# Patient Record
Sex: MALE | URBAN - METROPOLITAN AREA
[De-identification: names, ages, dates, MRNs, and addresses within clinical notes are randomized per-mention and may not be internally consistent; named-entity substitution may affect disease eponyms.]

---

## 2024-01-03 ENCOUNTER — PREP FOR PROCEDURE (OUTPATIENT)
Age: 64
End: 2024-01-03

## 2024-01-03 DIAGNOSIS — Z12.11 SCREENING FOR COLON CANCER: Primary | ICD-10-CM

## 2024-02-19 ENCOUNTER — ANESTHESIA (OUTPATIENT)
Dept: GASTROENTEROLOGY | Facility: HOSPITAL | Age: 64
End: 2024-02-19

## 2024-02-19 ENCOUNTER — HOSPITAL ENCOUNTER (OUTPATIENT)
Dept: GASTROENTEROLOGY | Facility: HOSPITAL | Age: 64
Setting detail: OUTPATIENT SURGERY
Discharge: HOME | End: 2024-02-19
Attending: STUDENT IN AN ORGANIZED HEALTH CARE EDUCATION/TRAINING PROGRAM
Payer: COMMERCIAL

## 2024-02-19 ENCOUNTER — ANESTHESIA EVENT (OUTPATIENT)
Dept: GASTROENTEROLOGY | Facility: HOSPITAL | Age: 64
End: 2024-02-19

## 2024-02-19 VITALS
SYSTOLIC BLOOD PRESSURE: 115 MMHG | RESPIRATION RATE: 20 BRPM | BODY MASS INDEX: 21.03 KG/M2 | HEIGHT: 69 IN | OXYGEN SATURATION: 99 % | HEART RATE: 72 BPM | TEMPERATURE: 97 F | DIASTOLIC BLOOD PRESSURE: 74 MMHG | WEIGHT: 141.98 LBS

## 2024-02-19 DIAGNOSIS — Z12.11 SCREENING FOR COLON CANCER: ICD-10-CM

## 2024-02-19 PROBLEM — I10 HTN (HYPERTENSION): Status: ACTIVE | Noted: 2024-02-19

## 2024-02-19 PROBLEM — E78.5 HYPERLIPIDEMIA: Status: ACTIVE | Noted: 2024-02-19

## 2024-02-19 PROCEDURE — G0121 COLON CA SCRN NOT HI RSK IND: HCPCS | Performed by: INTERNAL MEDICINE

## 2024-02-19 RX ORDER — LOSARTAN POTASSIUM AND HYDROCHLOROTHIAZIDE 25; 100 MG/1; MG/1
1 TABLET ORAL DAILY
COMMUNITY
Start: 2023-09-13 | End: 2024-09-12

## 2024-02-19 RX ORDER — PROPOFOL 10 MG/ML
INJECTION, EMULSION INTRAVENOUS AS NEEDED
Status: DISCONTINUED | OUTPATIENT
Start: 2024-02-19 | End: 2024-02-19

## 2024-02-19 RX ORDER — PHENYLEPHRINE HCL IN 0.9% NACL 1 MG/10 ML
SYRINGE (ML) INTRAVENOUS AS NEEDED
Status: DISCONTINUED | OUTPATIENT
Start: 2024-02-19 | End: 2024-02-19

## 2024-02-19 RX ORDER — ATORVASTATIN CALCIUM 10 MG/1
10 TABLET, FILM COATED ORAL DAILY
COMMUNITY
Start: 2023-09-13 | End: 2024-09-12

## 2024-02-19 RX ORDER — SODIUM CHLORIDE, SODIUM LACTATE, POTASSIUM CHLORIDE, CALCIUM CHLORIDE 600; 310; 30; 20 MG/100ML; MG/100ML; MG/100ML; MG/100ML
INJECTION, SOLUTION INTRAVENOUS CONTINUOUS PRN
Status: DISCONTINUED | OUTPATIENT
Start: 2024-02-19 | End: 2024-02-19

## 2024-02-19 RX ORDER — LIDOCAINE HYDROCHLORIDE 20 MG/ML
INJECTION, SOLUTION EPIDURAL; INFILTRATION; INTRACAUDAL; PERINEURAL AS NEEDED
Status: DISCONTINUED | OUTPATIENT
Start: 2024-02-19 | End: 2024-02-19

## 2024-02-19 RX ORDER — MELOXICAM 15 MG/1
TABLET ORAL
COMMUNITY
Start: 2023-12-04

## 2024-02-19 RX ADMIN — Medication 100 MCG: at 10:36

## 2024-02-19 RX ADMIN — PROPOFOL 20 MG: 10 INJECTION, EMULSION INTRAVENOUS at 10:42

## 2024-02-19 RX ADMIN — PROPOFOL 20 MG: 10 INJECTION, EMULSION INTRAVENOUS at 10:39

## 2024-02-19 RX ADMIN — PROPOFOL 120 MG: 10 INJECTION, EMULSION INTRAVENOUS at 10:29

## 2024-02-19 RX ADMIN — PROPOFOL 20 MG: 10 INJECTION, EMULSION INTRAVENOUS at 10:37

## 2024-02-19 RX ADMIN — PROPOFOL 20 MG: 10 INJECTION, EMULSION INTRAVENOUS at 10:33

## 2024-02-19 RX ADMIN — SODIUM CHLORIDE, SODIUM LACTATE, POTASSIUM CHLORIDE, AND CALCIUM CHLORIDE: .6; .31; .03; .02 INJECTION, SOLUTION INTRAVENOUS at 10:29

## 2024-02-19 RX ADMIN — LIDOCAINE HYDROCHLORIDE 100 MG: 20 INJECTION, SOLUTION EPIDURAL; INFILTRATION; INTRACAUDAL; PERINEURAL at 10:29

## 2024-02-19 RX ADMIN — PROPOFOL 20 MG: 10 INJECTION, EMULSION INTRAVENOUS at 10:31

## 2024-02-19 NOTE — ANESTHESIA PREPROCEDURE EVALUATION
Procedure:  COLONOSCOPY    Relevant Problems   ANESTHESIA (within normal limits)      CARDIO   (+) HTN (hypertension)   (+) Hyperlipidemia      GI/HEPATIC  Confirmed NPO appropriate  S/p bowel prep  Reports drinking 1-2 beers daily      /RENAL (within normal limits)      HEMATOLOGY (within normal limits)      MUSCULOSKELETAL (within normal limits)      NEURO/PSYCH (within normal limits)      PULMONARY (within normal limits)   (-) Smoking   (-) URI (upper respiratory infection)        Physical Exam    Airway    Mallampati score: I  TM Distance: >3 FB  Neck ROM: full     Dental       Cardiovascular  Rhythm: regular, Rate: normal    Pulmonary   Breath sounds clear to auscultation    Other Findings        Anesthesia Plan  ASA Score- 2     Anesthesia Type- IV sedation with anesthesia with ASA Monitors.         Additional Monitors:     Airway Plan:     Comment: I discussed the risks and benefits of IV sedation anesthesia including the possibility of the need to convert to general anesthesia and the potential risk of awareness.  The patient was given the opportunity to ask questions, which were answered..       Plan Factors-Exercise tolerance (METS): >4 METS.    Chart reviewed.        Patient is not a current smoker.              Induction- intravenous.    Postoperative Plan-     Informed Consent- Anesthetic plan and risks discussed with patient.  I personally reviewed this patient with the CRNA. Discussed and agreed on the Anesthesia Plan with the CRNA..

## 2024-02-19 NOTE — H&P
"History and Physical -  [facility]  Cheikh Pappas 63 y.o. male MRN: [id number]      HPI: Cheikh Pappas is a 63 y.o. year old male who presents for screening colonoscopy      REVIEW OF SYSTEMS: Per the HPI, and otherwise unremarkable.    Historical Information   Past Medical History:   Diagnosis Date    Hyperlipidemia     Hypertension      Past Surgical History:   Procedure Laterality Date    COLONOSCOPY       Social History   Social History     Substance and Sexual Activity   Alcohol Use Yes    Alcohol/week: 10.0 standard drinks of alcohol    Types: 10 Cans of beer per week     Social History     Substance and Sexual Activity   Drug Use Not Currently    Types: Marijuana     Social History     Tobacco Use   Smoking Status Not on file   Smokeless Tobacco Not on file     History reviewed. No pertinent family history.    Meds/Allergies     (Not in a hospital admission)      No Known Allergies    Objective     Blood pressure 157/97, pulse 85, temperature (!) 97.2 °F (36.2 °C), temperature source Temporal, resp. rate 20, height 5' 9\" (1.753 m), weight 64.4 kg (141 lb 15.6 oz), SpO2 100%.      PHYSICAL EXAM    Gen: NAD  CV: RRR  CHEST: Clear  ABD: soft, NT/ND  EXT: no edema      ASSESSMENT/PLAN:  This is a 63 y.o. year old male here for colonoscopy, and he is stable and optimized for his procedure.          "

## 2025-04-03 VITALS — WEIGHT: 147 LBS | HEIGHT: 69 IN | BODY MASS INDEX: 21.77 KG/M2

## 2025-04-03 DIAGNOSIS — S52.121K CLOSED DISPLACED FRACTURE OF HEAD OF RIGHT RADIUS WITH NONUNION, SUBSEQUENT ENCOUNTER: ICD-10-CM

## 2025-04-03 DIAGNOSIS — M77.11 RIGHT TENNIS ELBOW: Primary | ICD-10-CM

## 2025-04-03 DIAGNOSIS — M71.329 SYNOVIAL CYST OF ELBOW: ICD-10-CM

## 2025-04-03 PROCEDURE — 99204 OFFICE O/P NEW MOD 45 MIN: CPT | Performed by: ORTHOPAEDIC SURGERY

## 2025-04-03 PROCEDURE — 20612 ASPIRATE/INJ GANGLION CYST: CPT | Performed by: ORTHOPAEDIC SURGERY

## 2025-04-03 NOTE — PROGRESS NOTES
Assessment & Plan  Right tennis elbow  Physical examination discussed with patient and his wife which demonstrated findings consistent with lateral epicondylitis in the setting of overuse. Recommended tennis elbow strap and therapy. Follow up after his camping trip in June 2025 as needed.     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 It was discussed in depth that his arthritic changes are a sequelae of his radial head fracture over ten years ago. 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 He had no prior issues until February of this year after throwing a football. It was discussed that it would be reasonable to perform osteocapsular arthroplasty with radial head arthroplasty if he feels his mechanical symptoms worsen over time. The patient would like to treat his tennis elbow symptoms first to see if this will relieve his ailments enough to avoid surgery.        Synovial cyst of elbow  3cm by 3cm anterior ganglion cyst 2cm distal to the elbow flexor crease that was mobile and not firm. Aspiration of the cyst was performed in the clinic today which produced 10cc of yellow clear viscous fluid. Patient has immediate relief after the aspiration.               Cheikh Fitzgeraldadriana  [id number]  1960    [facility]TH[facility] OUTPATIENT NOTE  4/3/2025      HISTORY:  64 y.o. male  who presents for initial evaluation of right elbow pain after an overuse injury in February of this year. He reports a remote radial head fracture that was treated non-operatively roughly ten years ago and he had no major issues since this past February. He reports he felt a pop in his elbow and has mechanical symptoms which are new. He has not lost range of motion compared to his baseline. He does report lateral  elbow pain that is his main concern. The pain is worse with wrist motion and elbow motion. He also reports a cyst in the anterior aspect of the elbow which was drained by another rprovider three weeks ago and has returned, which causes him pain with elbow motion. He would like to discuss options of cyst aspiration and further treatments for his elbow pain. He denies numbness or tingling. He denies weakness in his wrist. He does work outside with power tools often and is very active.      Past Medical History:   Diagnosis Date    Hyperlipidemia     Hypertension        Past Surgical History:   Procedure Laterality Date    COLONOSCOPY         Social History     Socioeconomic History    Marital status: /Civil Union     Spouse name: Not on file    Number of children: Not on file    Years of education: Not on file    Highest education level: Not on file   Occupational History    Not on file   Tobacco Use    Smoking status: Not on file    Smokeless tobacco: Not on file   Vaping Use    Vaping status: Never Used   Substance and Sexual Activity    Alcohol use: Yes     Alcohol/week: 10.0 standard drinks of alcohol     Types: 10 Cans of beer per week    Drug use: Not Currently     Types: Marijuana    Sexual activity: Not on file   Other Topics Concern    Not on file   Social History Narrative    Not on file     Social Drivers of Health     Financial Resource Strain: Low Risk  (9/12/2023)    [facility]    Overall Financial Resource Strain (CARDIA)     Difficulty of Paying Living Expenses: Not hard at all   Food Insecurity: No Food Insecurity (9/18/2024)    Received from [facility]    Hunger Vital Sign     Worried About Running Out of Food in the Last Year: Never true     Ran Out of Food in the Last Year: Never true   Transportation Needs: No Transportation Needs (9/18/2024)    Received from [facility]    HERNANDEZ   "Transportation     Lack of Transportation (Medical): No     Lack of Transportation (Non-Medical): No   Physical Activity: Not on file   Stress: No Stress Concern Present (9/12/2023)    Received from [facility]    [employer] - Occupational Stress Questionnaire     Feeling of Stress : Only a little   Social Connections: Feeling Socially Integrated (9/18/2024)    Received from [facility]    OASIS : Social Isolation     How often do you feel lonely or isolated from those around you?: Never   Intimate Partner Violence: Not At Risk (9/18/2024)    Received from [facility]    Humiliation, Afraid, Rape, and Kick questionnaire     Fear of Current or Ex-Partner: No     Emotionally Abused: No     Physically Abused: No     Sexually Abused: No   Housing Stability: Unknown (9/18/2024)    Received from [facility]    Housing Stability Vital Sign     Unable to Pay for Housing in the Last Year: No     Number of Times Moved in the Last Year: Not on file     Homeless in the Last Year: No       History reviewed. No pertinent family history.     Patient's Medications   New Prescriptions    No medications on file   Previous Medications    ATORVASTATIN (LIPITOR) 10 MG TABLET    Take 10 mg by mouth daily    LOSARTAN-HYDROCHLOROTHIAZIDE (HYZAAR) 100-25 MG PER TABLET    Take 1 tablet by mouth daily    MELOXICAM (MOBIC) 15 MG TABLET    TAKE 1 TABLET BY MOUTH EVERY DAY AFTER MEALS   Modified Medications    No medications on file   Discontinued Medications    No medications on file       No Known Allergies     Ht 5' 9\" (1.753 m)   Wt 66.7 kg (147 lb)   BMI 21.71 kg/m²      REVIEW OF SYSTEMS:  Constitutional: Negative.    HEENT: Negative.    Respiratory: Negative.    Skin: Negative.    Neurological: Negative.    Psychiatric/Behavioral: Negative.  Musculoskeletal: Negative except for that mentioned in the HPI.    Gen: No acute distress, " resting comfortably in bed  HEENT: Eyes clear, moist mucus membranes, hearing intact  Respiratory: No audible wheezing or stridor  Cardiovascular: Well Perfused peripherally, 2+ distal pulse  Abdomen: nondistended, no peritoneal signs     PHYSICAL EXAM:    RIGHT ELBOW:    Appearance: anterior elbow cyst measuring 3x3cm and located 2cm distal to the elbow flexion crease. There is mild increased valgus carrying angle compared to the left.     Flexion: 130 degrees  Extension 30 degrees  Pronation: 80 degrees  Supination: 80 degrees    TTP Lateral Epicondyle: Positive   TTP Medial Epicondyle: negative  TTP Olecranon: negative  TTP Radial Head: negative  TTP Biceps Tendon: negative    Strength:  Flexion: 5/5  Extension: 5/5  Pronation: 5/5  Supination: 5/5    Pain with resisted wrist extension: positive   Pain with resisted 3rd finger extension: Mild   Pain with resisted wrist flexion: negative    Varus laxity: negative  Valgus laxity: negative  Milking maneuver: negative  Moving valgus stress test: negative    Cubital tunnel Tinel's: negative    Radial/median/ulnar nerve intact    <2 sec cap refill        IMAGING:  AP, Lateral and oblique views XR of the right elbow performed February 17th 2025 were reviewed by me which demonstrate malunion of the radial head and neck with severe degenerative changes of the radiocapitellar and ulnohumeral joint.There are multiple loose bodies noted.      ASSESSMENT AND PLAN:  64 y.o. male  with remote history of right radial head fracture now with non-union sequela and post traumatic arthritis and loose body formation in the elbow. Patient also has a 3x3cm ganglion cyst in the anterior elbow which was aspirated in the office today. Patient also has lateral epicondylitis for which he was given PT and Tennis elbow strap for treatment. He will follow up in 6 weeks or after his camping trip in June.     Hand/upper extremity injection  Universal Protocol:  Patient identity confirmed:  verbally with patient  Supporting Documentation  Indications: pain (Ganglion cyst)   Procedure Details  Condition comment: Anterior elbow ganglion   Needle size: 18 G  Ultrasound guidance: no  Aspirate amount: 12 mL  Aspirate: clear and yellow  Dressing:  Sterile dressing applied

## 2025-04-03 NOTE — ASSESSMENT & PLAN NOTE
Physical examination discussed with patient and his wife which demonstrated findings consistent with lateral epicondylitis in the setting of overuse. Recommended tennis elbow strap and therapy. Follow up after his camping trip in June 2025 as needed.     Orders:    Ambulatory Referral to Physical Therapy; Future

## 2025-05-01 ENCOUNTER — EVALUATION (OUTPATIENT)
Dept: OCCUPATIONAL THERAPY | Facility: CLINIC | Age: 65
End: 2025-05-01
Attending: STUDENT IN AN ORGANIZED HEALTH CARE EDUCATION/TRAINING PROGRAM
Payer: COMMERCIAL

## 2025-05-01 DIAGNOSIS — M77.11 RIGHT TENNIS ELBOW: ICD-10-CM

## 2025-05-01 PROCEDURE — 97165 OT EVAL LOW COMPLEX 30 MIN: CPT

## 2025-05-01 PROCEDURE — 97112 NEUROMUSCULAR REEDUCATION: CPT

## 2025-05-01 PROCEDURE — 97110 THERAPEUTIC EXERCISES: CPT

## 2025-05-01 NOTE — PROGRESS NOTES
OT Evaluation     Today's date: 2025  Patient name: Cheikh Pappas  : 1960  MRN: [id number]  Referring provider: Karly Mcelroy DO  Dx:   Encounter Diagnosis     ICD-10-CM    1. Right tennis elbow  M77.11 Ambulatory Referral to Physical Therapy          Start Time: 828  Stop Time: 928  Total time in clinic (min): 60 minutes    Assessment  Impairments: abnormal coordination, abnormal or restricted ROM, abnormal movement, activity intolerance, impaired physical strength, lacks appropriate home exercise program, pain with function, weight-bearing intolerance, unable to perform ADL, activity limitations and endurance  Functional limitations: difficulty with lifting, pushing, pulling due to pain, loss of ROM, weakness  Symptom irritability: moderate    Assessment details: Cheikh Pappas is a 64 y.o., Right HD male referred to hand therapy for R tennis elbow.  Onset of injury >10 years ago due to R radial head fracture with nonunion and post-traumatic severe osteoarthritis of UHJ and RHJ. He re-aggravated his pain in February when throwing a football. He has made improvement since this injury but continues to have pain and stiffness. Patient presents 26 with impaired ROM, strength, and coordination of the RUE.  Deficits also noted in pain and functional use of the right UE. No laxity noted at elbow but patient does have tenderness directly over the fracture site of his radial head and neck and tenderness into his wrist extensors. He also has tightness and pain a the proximal aspect of his wrist flexors in the area of his cyst. Patient is on a trip for 6 weeks so he was provided with an HEP focused on wrist extensor and flexor stretches, biceps stretches, elbow A/AAROM and RTC/shoulder strengthening exercises.  Understanding of Dx/Px/POC: excellent     Prognosis: good    Goals  STGs (4 weeks)  Patient will be independent in implementing HEP prescribed by therapist  Patient will report an average  pain level of 1-2/10  Patient will demonstrate 10# improvement in  strength for improved use of R hand in twisting open jars and lids  Patient will demonstrate active elbow extension to -20 degrees for improved use of R elbow in pickleball  Patient will adhere to activity modification techniques to prevent further aggravation of symptoms    Plan  Patient would benefit from: skilled occupational therapy and home program  Planned modality interventions: thermotherapy: hydrocollator packs    Planned therapy interventions: IASTM, kinesiology taping, joint mobilization, manual therapy, massage, neuromuscular re-education, strengthening, stretching, therapeutic activities, therapeutic exercise, home exercise program, graded exercise, graded activity, functional ROM exercises, flexibility, coordination, compression and activity modification    Plan of Care beginning date: 5/1/2025  Treatment plan discussed with: patient  Plan details: Patient leaving for 6 week trip. He will perform HEP only and was educated that he may resume therapy when returning with a new script from his doctor.        Subjective Evaluation    History of Present Illness  Mechanism of injury: Cheikh Pappas is a 64 y.o. male presenting with right elbow pain after an overuse injury in February of this year. He sustained a radial head fracture that was treated non-operatively 10 years ago and he had no major issues since this past February. Patient now has post-traumatic arthritic changes and nonunion.   He reports he felt a pop in his elbow and has mechanical symptoms which are new. There are no new losses in ROM. He does report lateral elbow pain that is his main concern. The pain is worse with wrist motion and elbow motion as well as squeezing and gripping. He also reports a cyst in the anterior aspect of the elbow which was aspirated 2 months ago and again by orthopedics on 4/3/25.  Patient Goals  Patient goals for therapy: increased strength  "and return to sport/leisure activities  Patient goal: \"Be able to play pickleball without pain\"  Pain  Current pain ratin  At best pain ratin  At worst pain ratin  Location: Lateral aspect of elbow, anterior aspect over cyst, and radiating into extensors  Quality: dull ache  Relieving factors: heat  Exacerbated by: gripping, twisting, heavy pushing, pulling, lifting.  Progression: improved    Social Support    Employment status: not working (retired)  Hand dominance: right      Diagnostic Tests  X-ray: abnormal (right elbow performed 2025 were reviewed by me which demonstrate malunion of the radial head and neck with severe degenerative changes of the radiocapitellar and ulnohumeral joint.There are multiple loose bodies noted)  Treatments  Previous treatment: injection treatment  Current treatment: occupational therapy        Objective     Observations     Right Elbow   Positive for deformity.     Additional Observation Details  Volar forearm cyst distal to antecubital fossa    Tenderness     Right Elbow   Tenderness in the radial head and radiocapitellar joint. No tenderness in the lateral epicondyle.     Right Wrist/Hand   No tenderness in the lateral epicondyle.     Active Range of Motion     Right Shoulder   Normal active range of motion    Left Elbow   Flexion: 145 degrees   Extension: -29 degrees     Right Elbow   Flexion: 145 degrees   Extension: -3 degrees   Forearm supination: WFL  Forearm pronation: 64 degrees with pain    Left Wrist   Wrist flexion: 60 degrees   Wrist extension: 61 degrees     Right Wrist   Wrist flexion: 66 degrees   Wrist extension: 60 degrees     Additional Active Range of Motion Details  D2-5 and thumb WNLs    Passive Range of Motion     Right Elbow   Extension: -22 degrees     Strength/Myotome Testing     Right Shoulder     Planes of Motion   Flexion: 4+   Adduction: 5   External rotation at 0°: 5   Internal rotation at 0°: 5     Right Elbow   Flexion: " "5  Extension: 5  Forearm supination: 5  Forearm pronation: 4    Left Wrist/Hand      (2nd hand position)     Trial 1: 99.6    Thumb Strength  Key/Lateral Pinch     Trial 1: 24  Palmar/Three-Point Pinch     Trial 1: 21    Right Wrist/Hand   Wrist extension: 5  Wrist flexion: 5  Radial deviation: 5  Ulnar deviation: 5     (2nd hand position)     Trial 1: 70.9    Thumb Strength   Key/Lateral Pinch     Trial 1: 19  Palmar/Three-Point Pinch     Trial 1: 17    Additional Strength Details  Reports pressure at his lateral elbow when resisting ER and IR    Tests     Right Elbow   Positive Cozen's and Maudsley's.   Negative Mill's, valgus stress at 0 degrees and valgus stress at 30 degrees.     Additional Tests Details  (+) Biceps tightness: -41 active EE with shoulder extended, -23 active EE with shoulder flexed    Swelling   Left Elbow Girth Measurements   Joint line: 28 cm    Right Elbow Girth Measurements   Joint line: 28 cm             Precautions: R radial head fx nonunion  Access Code: [email or web address]  POC expires Unit limit Auth  expiration date PT/OT + Visit Limit?   7/10/25   30 PT/OT                 Visit/Unit Tracking  AUTH Status:  Date 5/1 IE              Auth req Used 1               Remaining                     Manuals 5/1 IE                                            Neuro Re-Ed         IR with TB 3x10        ER with TB 3x10        Shldr ext with TB 3x10                                            Ther Ex         Wrist extensor stretch 10 x 10\"        Wrist flexor stretch 10 x 10\"        Elbow A/AAROM X10 AA        Biceps stretch 3 x 30\"                                            Ther Activity         Activity mod Avoid lifting elbow ext, FA pronated                  HEP                           Modalities         MHP 5'                         "

## 2025-06-18 ENCOUNTER — HOSPITAL ENCOUNTER (OUTPATIENT)
Dept: RADIOLOGY | Facility: HOSPITAL | Age: 65
Discharge: HOME | End: 2025-06-18
Attending: ORTHOPAEDIC SURGERY
Payer: COMMERCIAL

## 2025-06-18 VITALS — BODY MASS INDEX: 21.44 KG/M2 | WEIGHT: 145.2 LBS

## 2025-06-18 DIAGNOSIS — M25.521 PAIN IN RIGHT ELBOW: ICD-10-CM

## 2025-06-18 DIAGNOSIS — M71.329 SYNOVIAL CYST OF ELBOW: ICD-10-CM

## 2025-06-18 DIAGNOSIS — S52.121K CLOSED DISPLACED FRACTURE OF HEAD OF RIGHT RADIUS WITH NONUNION, SUBSEQUENT ENCOUNTER: Primary | ICD-10-CM

## 2025-06-18 PROCEDURE — 73080 X-RAY EXAM OF ELBOW: CPT

## 2025-06-18 PROCEDURE — 99215 OFFICE O/P EST HI 40 MIN: CPT | Performed by: ORTHOPAEDIC SURGERY

## 2025-06-18 RX ORDER — CHLORHEXIDINE GLUCONATE 40 MG/ML
SOLUTION TOPICAL DAILY PRN
OUTPATIENT
Start: 2025-06-18

## 2025-06-18 RX ORDER — SODIUM CHLORIDE, SODIUM LACTATE, POTASSIUM CHLORIDE, CALCIUM CHLORIDE 600; 310; 30; 20 MG/100ML; MG/100ML; MG/100ML; MG/100ML
20 INJECTION, SOLUTION INTRAVENOUS CONTINUOUS
OUTPATIENT
Start: 2025-06-18

## 2025-06-18 RX ORDER — CHLORHEXIDINE GLUCONATE ORAL RINSE 1.2 MG/ML
15 SOLUTION DENTAL ONCE
OUTPATIENT
Start: 2025-06-18 | End: 2025-06-18

## 2025-06-18 NOTE — PROGRESS NOTES
ORTHO CARE [facility] [address] [address] [phone]       Cheikh Pappas  [id number]  1960    ORTHOPAEDIC SURGERY OUTPATIENT NOTE  6/18/2025    :  Assessment & Plan         - Patient presents with further displacement of his nonunion radial head and neck fracture.  -At this time I discussed surgery to remove the debris and possible radial head arthroplasty as well as open osteocapsular arthroplasty.  - I did discuss to remove the ganglion cyst as well.  - At this time he elected for surgery, benefits and risks were explained in detail, he elected for nonunion radial head arthroplasty and ganglion cyst removal as well as osteophyte excision.  -I did discuss with him that we will need further imaging such as MRI and CT scans to further evaluate his condition.  The patient understands the risks and benefits of the procedure with risks including pain, stiffness, infection, neurovascular injury, recurrence of symptoms, failure of surgical procedure, inadvertent intraoperative complications, blood loss, blood clots, allergic reaction to anesthesia, stroke, heart attack, all up to and including to death. The patient understood and did consent for surgery today.         HISTORY:  64 y.o. 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  Patient states he was not using the tennis elbow strap as this was irritating his cyst along his elbow.  He does note that approximately 3 weeks ago he was lifting a carseat and felt a painful pop in his elbow and noticed increase in swelling and painful bony bump along the lateral aspect of his elbow.  He denies numbness and tingling traveling down his arm, has not been using OTC medicine for pain control.  He did go to formal physical therapy and was given home  stretches and exercises, he was compliant with the stretches however the exercises increase his pain.  He states he is ready to talk about surgery to alleviate his pain and increase his range of motion.        Past Medical History[1]    Past Surgical History[2]    Social History     Socioeconomic History    Marital status: /Civil Union     Spouse name: Not on file    Number of children: Not on file    Years of education: Not on file    Highest education level: Not on file   Occupational History    Not on file   Tobacco Use    Smoking status: Not on file    Smokeless tobacco: Not on file   Vaping Use    Vaping status: Never Used   Substance and Sexual Activity    Alcohol use: Yes     Alcohol/week: 10.0 standard drinks of alcohol     Types: 10 Cans of beer per week    Drug use: Not Currently     Types: Marijuana    Sexual activity: Not on file   Other Topics Concern    Not on file   Social History Narrative    Not on file     Social Drivers of Health     Financial Resource Strain: Low Risk  (9/12/2023)    Received from [facility]    Overall Financial Resource Strain (CARDIA)     Difficulty of Paying Living Expenses: Not hard at all   Food Insecurity: No Food Insecurity (9/18/2024)    Received from [facility]    Hunger Vital Sign     Within the past 12 months, you worried that your food would run out before you got the money to buy more.: Never true     Within the past 12 months, the food you bought just didn't last and you didn't have money to get more.: Never true   Transportation Needs: No Transportation Needs (9/18/2024)    Received from [facility]    PRAPARE - Transportation     Lack of Transportation (Medical): No     Lack of Transportation (Non-Medical): No   Physical Activity: Not on file   Stress: No Stress Concern Present (9/12/2023)    Received from [facility]    [place] [employer] - Occupational Stress  Questionnaire     Feeling of Stress : Only a little   Social Connections: Feeling Socially Integrated (9/18/2024)    Received from [facility]    OASIS : Social Isolation     How often do you feel lonely or isolated from those around you?: Never   Intimate Partner Violence: Not At Risk (9/18/2024)    Received from [facility]    Humiliation, Afraid, Rape, and Kick questionnaire     Within the last year, have you been afraid of your partner or ex-partner?: No     Within the last year, have you been humiliated or emotionally abused in other ways by your partner or ex-partner?: No     Within the last year, have you been kicked, hit, slapped, or otherwise physically hurt by your partner or ex-partner?: No     Within the last year, have you been raped or forced to have any kind of sexual activity by your partner or ex-partner?: No   Housing Stability: Unknown (9/18/2024)    Received from [facility]    Housing Stability Vital Sign     In the last 12 months, was there a time when you were not able to pay the mortgage or rent on time?: No     Number of Times Moved in the Last Year: Not on file     At any time in the past 12 months, were you homeless or living in a shelter (including now)?: No       Family History[3]     Patient's Medications   New Prescriptions    No medications on file   Previous Medications    ATORVASTATIN (LIPITOR) 10 MG TABLET    Take 10 mg by mouth daily    LOSARTAN-HYDROCHLOROTHIAZIDE (HYZAAR) 100-25 MG PER TABLET    Take 1 tablet by mouth daily    MELOXICAM (MOBIC) 15 MG TABLET    TAKE 1 TABLET BY MOUTH EVERY DAY AFTER MEALS   Modified Medications    No medications on file   Discontinued Medications    No medications on file       Allergies[4]     Wt 65.9 kg (145 lb 3.2 oz)   BMI 21.44 kg/m²      REVIEW OF SYSTEMS:  Constitutional: Negative.    HEENT: Negative.    Respiratory: Negative.    Skin: Negative.    Neurological: Negative.     Psychiatric/Behavioral: Negative.  Musculoskeletal: Negative except for that mentioned in the HPI.    Gen: No acute distress, resting comfortably in bed  HEENT: Eyes clear, moist mucus membranes, hearing intact  Respiratory: No audible wheezing or stridor  Cardiovascular: Well Perfused peripherally, 2+ distal pulse  Abdomen: nondistended, no peritoneal signs     PHYSICAL EXAM:  RIGHT ELBOW:    Appearance: anterior elbow cyst measuring 3x2cm and located 2cm distal to the elbow flexion crease. There is mild increased valgus carrying angle compared to the left.  Localized bony bump along the radial head.      Flexion: 130 degrees  Extension: 24 degrees  Pronation: 80 degrees  Supination: 80 degrees    TTP Lateral Epicondyle: negative  TTP Medial Epicondyle: negative  TTP Olecranon: negative  TTP Radial Head: positive  TTP Biceps Tendon: negative    Strength:  Flexion: 5/5  Extension: 5/5  Pronation: 5/5  Supination: 5/5    Pain with resisted wrist extension: positive   Pain with resisted 3rd finger extension: negative  Pain with resisted wrist flexion: negative    Varus laxity: negative  Valgus laxity: negative  Milking maneuver: negative  Moving valgus stress test: negative    Cubital tunnel Tinel's: negative    Radial/median/ulnar nerve intact    <2 sec cap refill       IMAGING:  Xrays of the right elbow obtained on 6/18/2025 demonstrate AP, Lateral and oblique views were reviewed by me which demonstrate malunion of the radial head and neck with severe degenerative changes of the radiocapitellar and ulnohumeral joint, with further displacement of the radial head. There are multiple loose bodies noted           [1]   Past Medical History:  Diagnosis Date    Hyperlipidemia     Hypertension    [2]   Past Surgical History:  Procedure Laterality Date    COLONOSCOPY     [3] No family history on file.  [4] No Known Allergies

## 2025-06-18 NOTE — H&P (VIEW-ONLY)
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  -At this time I discussed surgery to remove the debris and possible radial head arthroplasty as well as open osteocapsular arthroplasty.  - I did discuss to remove the ganglion cyst as well.  - At this time he elected for surgery, benefits and risks were explained in detail, he elected for nonunion radial head arthroplasty and ganglion cyst removal as well as osteophyte excision.  -I did discuss with him that we will need further imaging such as MRI and CT scans to further evaluate his condition.  The patient understands the risks and benefits of the procedure with risks including pain, stiffness, infection, neurovascular injury, recurrence of symptoms, failure of surgical procedure, inadvertent intraoperative complications, blood loss, blood clots, allergic reaction to anesthesia, stroke, heart attack, all up to and including to death. The patient understood and did consent for surgery today.         HISTORY:  64 y.o. 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  Patient states he was not using the tennis elbow strap as this was irritating his cyst along his elbow.  He does note that approximately 3 weeks ago he was lifting a carseat and felt a painful pop in his elbow and noticed increase in swelling and painful bony bump along the lateral aspect of his elbow.  He denies numbness and tingling traveling down his arm, has not been using OTC medicine for pain control.  He did go to formal physical therapy and was given home  stretches and exercises, he was compliant with the stretches however the exercises increase his pain.  He states he is ready to talk about surgery to alleviate his pain and increase his range of motion.        Past Medical History[1]    Past Surgical History[2]    Social History     Socioeconomic History    Marital status: /Civil Union     Spouse name: Not on file    Number of children: Not on file    Years of education: Not on file    Highest education level: Not on file   Occupational History    Not on file   Tobacco Use    Smoking status: Not on file    Smokeless tobacco: Not on file   Vaping Use    Vaping status: Never Used   Substance and Sexual Activity    Alcohol use: Yes     Alcohol/week: 10.0 standard drinks of alcohol     Types: 10 Cans of beer per week    Drug use: Not Currently     Types: Marijuana    Sexual activity: Not on file   Other Topics Concern    Not on file   Social History Narrative    Not on file     Social Drivers of Health     Financial Resource Strain: Low Risk  (9/12/2023)    Received from [facility]    Overall Financial Resource Strain (CARDIA)     Difficulty of Paying Living Expenses: Not hard at all   Food Insecurity: No Food Insecurity (9/18/2024)    Received from [facility]    Hunger Vital Sign     Within the past 12 months, you worried that your food would run out before you got the money to buy more.: Never true     Within the past 12 months, the food you bought just didn't last and you didn't have money to get more.: Never true   Transportation Needs: No Transportation Needs (9/18/2024)    Received from [facility]    PRAPARE - Transportation     Lack of Transportation (Medical): No     Lack of Transportation (Non-Medical): No   Physical Activity: Not on file   Stress: No Stress Concern Present (9/12/2023)    Received from [facility]    [place] [employer] - Occupational Stress  Questionnaire     Feeling of Stress : Only a little   Social Connections: Feeling Socially Integrated (9/18/2024)    Received from [facility]    OASIS : Social Isolation     How often do you feel lonely or isolated from those around you?: Never   Intimate Partner Violence: Not At Risk (9/18/2024)    Received from [facility]    Humiliation, Afraid, Rape, and Kick questionnaire     Within the last year, have you been afraid of your partner or ex-partner?: No     Within the last year, have you been humiliated or emotionally abused in other ways by your partner or ex-partner?: No     Within the last year, have you been kicked, hit, slapped, or otherwise physically hurt by your partner or ex-partner?: No     Within the last year, have you been raped or forced to have any kind of sexual activity by your partner or ex-partner?: No   Housing Stability: Unknown (9/18/2024)    Received from [facility]    Housing Stability Vital Sign     In the last 12 months, was there a time when you were not able to pay the mortgage or rent on time?: No     Number of Times Moved in the Last Year: Not on file     At any time in the past 12 months, were you homeless or living in a shelter (including now)?: No       Family History[3]     Patient's Medications   New Prescriptions    No medications on file   Previous Medications    ATORVASTATIN (LIPITOR) 10 MG TABLET    Take 10 mg by mouth daily    LOSARTAN-HYDROCHLOROTHIAZIDE (HYZAAR) 100-25 MG PER TABLET    Take 1 tablet by mouth daily    MELOXICAM (MOBIC) 15 MG TABLET    TAKE 1 TABLET BY MOUTH EVERY DAY AFTER MEALS   Modified Medications    No medications on file   Discontinued Medications    No medications on file       Allergies[4]     Wt 65.9 kg (145 lb 3.2 oz)   BMI 21.44 kg/m²      REVIEW OF SYSTEMS:  Constitutional: Negative.    HEENT: Negative.    Respiratory: Negative.    Skin: Negative.    Neurological: Negative.     Psychiatric/Behavioral: Negative.  Musculoskeletal: Negative except for that mentioned in the HPI.    Gen: No acute distress, resting comfortably in bed  HEENT: Eyes clear, moist mucus membranes, hearing intact  Respiratory: No audible wheezing or stridor  Cardiovascular: Well Perfused peripherally, 2+ distal pulse  Abdomen: nondistended, no peritoneal signs     PHYSICAL EXAM:  RIGHT ELBOW:    Appearance: anterior elbow cyst measuring 3x2cm and located 2cm distal to the elbow flexion crease. There is mild increased valgus carrying angle compared to the left.  Localized bony bump along the radial head.      Flexion: 130 degrees  Extension: 24 degrees  Pronation: 80 degrees  Supination: 80 degrees    TTP Lateral Epicondyle: negative  TTP Medial Epicondyle: negative  TTP Olecranon: negative  TTP Radial Head: positive  TTP Biceps Tendon: negative    Strength:  Flexion: 5/5  Extension: 5/5  Pronation: 5/5  Supination: 5/5    Pain with resisted wrist extension: positive   Pain with resisted 3rd finger extension: negative  Pain with resisted wrist flexion: negative    Varus laxity: negative  Valgus laxity: negative  Milking maneuver: negative  Moving valgus stress test: negative    Cubital tunnel Tinel's: negative    Radial/median/ulnar nerve intact    <2 sec cap refill       IMAGING:  Xrays of the right elbow obtained on 6/18/2025 demonstrate AP, Lateral and oblique views were reviewed by me which demonstrate malunion of the radial head and neck with severe degenerative changes of the radiocapitellar and ulnohumeral joint, with further displacement of the radial head. There are multiple loose bodies noted           [1]   Past Medical History:  Diagnosis Date    Hyperlipidemia     Hypertension    [2]   Past Surgical History:  Procedure Laterality Date    COLONOSCOPY     [3] No family history on file.  [4] No Known Allergies

## 2025-06-24 DIAGNOSIS — Z01.818 PREOPERATIVE CLEARANCE: Primary | ICD-10-CM

## 2025-06-24 PROBLEM — S52.91XA CLOSED RIGHT RADIAL FRACTURE: Status: ACTIVE | Noted: 2025-06-24

## 2025-06-24 PROBLEM — N52.8 OTHER MALE ERECTILE DYSFUNCTION: Status: ACTIVE | Noted: 2025-06-24

## 2025-06-24 PROBLEM — M67.40 GANGLION CYST: Status: ACTIVE | Noted: 2022-04-12

## 2025-06-24 RX ORDER — SILDENAFIL 100 MG/1
TABLET, FILM COATED ORAL
COMMUNITY
Start: 2025-01-27

## 2025-06-24 NOTE — PROGRESS NOTES
Internal Medicine Pre-Operative Evaluation:     Reason for Visit: Pre-operative Evaluation for Risk Stratification and Optimization    Patient ID: Cheikh Pappas is a 64 y.o. male.     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     After evaluation and discussion with patient with emphasis that all surgery has some degree of inherent risk it is acknowledged by patient this risk is Acceptable.    Patient is optimized and may proceed with planned procedure.     Assessment    Pre-operative Medical Evaluation for planned surgery  Recommendations as listed in PLAN section below  Contact surgical nurse  navigator with any questions regarding preoperative plan or schedule.      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 Further preoperative workup as follows:   - none no further testing required may proceed with surgery    2. Preoperative Medication  Management Review performed by [facility] nursing  NO    3. Patient requires further consultation with:   No Consults Required    4. Discharge Planning / Barriers to Discharge  none identified - patients has post discharge therapy plan in place, transportation arranged for discharge day, adequate family support at home to assist with discharge to home.        Subjective:           History of Present Illness:     Cheikh Pappas is a 64 y.o. male who presents to the office today for a preoperative consultation at the request of surgeon. The patient understands this is an elective procedure and not emergent. They are electing to undergo planned procedure with an understanding that all surgery has inherent risk. They have worked with their surgeon and failed conservative treatment measures. Today they present for preoperative risk assessment and recommendations for optimization in preparation for surgery.    Pt seen in center for perioperative medicine for upcoming proposed surgery. They have failed previous conservative measures and have elected surgical intervention.     Pt meets presurgical lab and optimization goals.      Cheikh Pappas has an IN HOSPITAL cardiac risk of RCI RISK CLASS I (0 risk factors, risk of major cardiac compl. appr. 0.5%) based on RCRI calculator    Cardiac Risk Estimation: per the Revised Cardiac Risk Index (Circ. 100:1043, 1999),         Pre-op Exam    Previous history of bleeding disorders or clots?: No  Previous Anesthesia reaction?: No  Prolonged steroid use in the last 6 months?: No    Assessment of Cardiac Risk:   - Unstable or severe angina or MI in the last 6 weeks or history of stent placement in the last year?: No   - Decompensated heart failure (e.g. New onset heart failure, NYHA  Class IV heart failure, or worsening existing heart failure)?: No  - Significant arrhythmias such as high grade AV block, symptomatic ventricular arrhythmia, newly recognized ventricular tachycardia,  "supraventricular tachycardia with resting heart rate >100, or symptomatic bradycardia?: No  - Severe heart valve disease including aortic stenosis or symptomatic mitral stenosis?: No      Pre-operative Risk Factors:  Elevated-risk surgery: No    History of cerebrovascular disease: No    History of ischemic heart disease: No  Pre-operative treatment with insulin: No  Pre-operative creatinine >2 mg/dL: No    History of congestive heart failure: No    Duke Activity Status Index (DASI):   DASI Total Score: 42.2  METs: 7.9        ROS: No TIA's or unusual headaches, no dysphagia.  No prolonged cough. No dyspnea or chest pain on exertion.  No abdominal pain, change in bowel habits, black or bloody stools.  No urinary tract or BPH symptoms.  Positive reported pain in arthritic joint. Positive difficulty with gait. No skin rashes or issues.      Objective:    /82   Pulse 84   Ht 5' 9\" (1.753 m)   Wt 64.4 kg (142 lb)   BMI 20.97 kg/m²       General Appearance: no distress, conversive  HEENT: PERRLA, conjuctiva normal; oropharynx clear; mucous membranes moist;   Neck:  Supple, no lymphadenopathy or thyromegaly  Lungs: breath sounds normal, normal respiratory effort, no retractions, expiratory effort normal  CV: normal heart sounds S1/S2, PMI normal   ABD: soft non tender, +BSx4  EXT: DP pulses intact, no lymphadenopathy, no edema  Skin: normal turgor, normal texture, no rash  Psych: affect normal, mood normal  Neuro: AAOx3        The following portions of the patient's history were reviewed and updated as appropriate: allergies, current medications, past family history, past medical history, past social history, past surgical history and problem list.     Past History:       Past Medical History[1] Past Surgical History[2]       Social History[3]  Family History[4]       Allergies:     Allergies[5]     Current Medications:     Current Outpatient Medications   Medication Instructions    atorvastatin (LIPITOR) 10 mg, " "Daily    losartan-hydrochlorothiazide (HYZAAR) 100-25 MG per tablet 1 tablet, Daily    sildenafil (VIAGRA) 100 mg tablet TAKE 1 TABLET DAILY AS NEEDED FOR ERECTILE DYSFUNCTION           PRE-OP WORKSHEET DATA    Assessment of Pre-Operative Risks     MLJ Quality Hard Stops:    BMI (<40) : Estimated body mass index is 20.97 kg/m² as calculated from the following:    Height as of this encounter: 5' 9\" (1.753 m).    Weight as of this encounter: 64.4 kg (142 lb).    Hgb ( >11):   Lab Results   Component Value Date    HGB 14.4 07/01/2025       HbA1c (<7.5) :   Lab Results   Component Value Date    HGBA1C 5.5 07/01/2025       GFR (>60) (Less then 45 = Nephrology consult):    Lab Results   Component Value Date    EGFR 67 07/01/2025    EGFR 86 09/11/2024    EGFR 81 08/21/2023            Pre-Op Data Reviewed:       Laboratory Results: I have personally reviewed the pertinent reports    EKG: I personally reviewed and interpreted available tracings in the electronic medical record    Encounter Date: 07/01/25   ECG 12 lead   Result Value    Ventricular Rate 88    Atrial Rate 88    PA Interval 184    QRSD Interval 88    QT Interval 350    QTC Interval 424    P Axis 43    QRS Axis 34    T Wave Axis 55    Narrative    Normal sinus rhythm  Normal ECG  No previous ECGs available  Confirmed by Alberto Alejandre ([id number]) on 7/2/2025 5:19:11 AM       OLD RECORDS: reviewed old records in the chart review section if EHR on day of visit.    Previous cardiopulmonary studies within the past year:  Echocardiogram:none    Previous STRESS TEST:  No results found for this or any previous visit.    Previous Cath/PCI:  No results found for this or any previous visit.    ECHO:  No results found for this or any previous visit.        Time of visit including pre-visit chart review, visit and post-visit coordination of plan and care , review of pre-surgical lab work, preparation and time spent documenting note in electronic medical record, time spent " face-to-face in physical examination answering patient questions by care team was a minimum of  35 minutes             [facility]         [1]   Past Medical History:  Diagnosis Date    Hyperlipidemia     Hypertension    [2]   Past Surgical History:  Procedure Laterality Date    COLONOSCOPY      KNEE ARTHROSCOPY Left    [3]   Social History  Tobacco Use    Smoking status: Never    Smokeless tobacco: Never   Vaping Use    Vaping status: Never Used   Substance Use Topics    Alcohol use: Yes     Alcohol/week: 10.0 standard drinks of alcohol     Types: 10 Cans of beer per week    Drug use: Not Currently     Types: Marijuana   [4] No family history on file.  [5] No Known Allergies

## 2025-06-24 NOTE — PATIENT INSTRUCTIONS
BEFORE SURGERY    Contact your surgical nurse navigator or surgical provider with any questions regarding preoperative plan or schedule.  Stop all over the counter supplements, herbal, naturopathic  medications for 1 week prior to surgery UNLESS prescribed by your surgeon  Hold NSAIDS (i.e. advil, alleve, motrin, ibuprofen, celebrex) minimum 5 days prior to surgery  Follow presurgical medication instructions provided by preadmission nursing team reviewed during your presurgery phone call  Strategies for optimizing your surgery through breathing exercises, nutrition and physical activity can be found at [email or web address]  Call [phone] with any presurgical concerns or medications questions or use the messaging feature in your Hung[facility] tarik to contact your provider    AFTER SURGERY    Recommend using Tylenol ( acetaminophen ) 1000 mg every eight hours during the first week post discharge along with icing the area for 20 mins every 3-4 hours while awake can be helpful in reducing your need for post operative opioid use. This opioid sparing plan can be used along side your surgeons pain plan.  Use stool softener over the counter (colace) daily after surgery during the first 1-2 weeks to avoid post operative constipation issues  If no bowel movement within 3 days after surgery then use over the counter Miralax in addition to your stool softener   If cleared by your surgical team for activity then early and often walking is encouraged and can be important in prevention of post surgical blood clots. Additionally spend as much time out of bed as possible and allowed by your surgical team  Use your incentive spirometer twice per hour in the first seven days after surgery to help prevent post surgery lung complications and infections  It is very important you follow the instructions from your surgeon regarding any medications for after surgery blood clot prevention. Compliance with these medications or interventions is very  important.  Call [phone] with any post discharge concerns or medical issues or use the messaging feature in your upad tarik to contact your provider

## 2025-07-01 ENCOUNTER — OFFICE VISIT (OUTPATIENT)
Dept: LAB | Facility: HOSPITAL | Age: 65
End: 2025-07-01
Attending: NURSE PRACTITIONER
Payer: COMMERCIAL

## 2025-07-01 ENCOUNTER — HOSPITAL ENCOUNTER (OUTPATIENT)
Dept: MRI IMAGING | Facility: HOSPITAL | Age: 65
Discharge: HOME | End: 2025-07-01
Attending: ORTHOPAEDIC SURGERY
Payer: COMMERCIAL

## 2025-07-01 ENCOUNTER — HOSPITAL ENCOUNTER (OUTPATIENT)
Dept: CT IMAGING | Facility: HOSPITAL | Age: 65
Discharge: HOME | End: 2025-07-01
Attending: ORTHOPAEDIC SURGERY
Payer: COMMERCIAL

## 2025-07-01 ENCOUNTER — APPOINTMENT (OUTPATIENT)
Dept: LAB | Facility: HOSPITAL | Age: 65
End: 2025-07-01
Payer: COMMERCIAL

## 2025-07-01 DIAGNOSIS — Z01.818 PREOPERATIVE CLEARANCE: ICD-10-CM

## 2025-07-01 DIAGNOSIS — S52.121K CLOSED DISPLACED FRACTURE OF HEAD OF RIGHT RADIUS WITH NONUNION, SUBSEQUENT ENCOUNTER: ICD-10-CM

## 2025-07-01 LAB
ALBUMIN SERPL BCG-MCNC: 4.3 G/DL (ref 3.5–5)
ALP SERPL-CCNC: 59 U/L (ref 34–104)
ALT SERPL W P-5'-P-CCNC: 18 U/L (ref 7–52)
ANION GAP SERPL CALCULATED.3IONS-SCNC: 7 MMOL/L (ref 4–13)
AST SERPL W P-5'-P-CCNC: 24 U/L (ref 13–39)
BASOPHILS # BLD AUTO: 0.02 THOUSANDS/ÂΜL (ref 0–0.1)
BASOPHILS NFR BLD AUTO: 0 % (ref 0–1)
BILIRUB SERPL-MCNC: 0.74 MG/DL (ref 0.2–1)
BUN SERPL-MCNC: 25 MG/DL (ref 5–25)
CALCIUM SERPL-MCNC: 9.8 MG/DL (ref 8.4–10.2)
CHLORIDE SERPL-SCNC: 99 MMOL/L (ref 96–108)
CO2 SERPL-SCNC: 31 MMOL/L (ref 21–32)
CREAT SERPL-MCNC: 1.15 MG/DL (ref 0.6–1.3)
CRP SERPL QL: 9.5 MG/L
EOSINOPHIL # BLD AUTO: 0.11 THOUSAND/ÂΜL (ref 0–0.61)
EOSINOPHIL NFR BLD AUTO: 2 % (ref 0–6)
ERYTHROCYTE [DISTWIDTH] IN BLOOD BY AUTOMATED COUNT: 12.8 % (ref 11.6–15.1)
ERYTHROCYTE [SEDIMENTATION RATE] IN BLOOD: 22 MM/HOUR (ref 0–19)
GFR SERPL CREATININE-BSD FRML MDRD: 67 ML/MIN/1.73SQ M
GLUCOSE SERPL-MCNC: 92 MG/DL (ref 65–140)
HCT VFR BLD AUTO: 43.4 % (ref 36.5–49.3)
HGB BLD-MCNC: 14.4 G/DL (ref 12–17)
IMM GRANULOCYTES # BLD AUTO: 0.01 THOUSAND/UL (ref 0–0.2)
IMM GRANULOCYTES NFR BLD AUTO: 0 % (ref 0–2)
INR PPP: 0.94 (ref 0.85–1.19)
LYMPHOCYTES # BLD AUTO: 1.16 THOUSANDS/ÂΜL (ref 0.6–4.47)
LYMPHOCYTES NFR BLD AUTO: 19 % (ref 14–44)
MCH RBC QN AUTO: 34.2 PG (ref 26.8–34.3)
MCHC RBC AUTO-ENTMCNC: 33.2 G/DL (ref 31.4–37.4)
MCV RBC AUTO: 103 FL (ref 82–98)
MONOCYTES # BLD AUTO: 0.39 THOUSAND/ÂΜL (ref 0.17–1.22)
MONOCYTES NFR BLD AUTO: 6 % (ref 4–12)
NEUTROPHILS # BLD AUTO: 4.51 THOUSANDS/ÂΜL (ref 1.85–7.62)
NEUTS SEG NFR BLD AUTO: 73 % (ref 43–75)
NRBC BLD AUTO-RTO: 0 /100 WBCS
PLATELET # BLD AUTO: 228 THOUSANDS/UL (ref 149–390)
PMV BLD AUTO: 8.6 FL (ref 8.9–12.7)
POTASSIUM SERPL-SCNC: 3.7 MMOL/L (ref 3.5–5.3)
PROT SERPL-MCNC: 7.1 G/DL (ref 6.4–8.4)
PROTHROMBIN TIME: 13.1 SECONDS (ref 12.3–15)
RBC # BLD AUTO: 4.21 MILLION/UL (ref 3.88–5.62)
SODIUM SERPL-SCNC: 137 MMOL/L (ref 135–147)
WBC # BLD AUTO: 6.2 THOUSAND/UL (ref 4.31–10.16)

## 2025-07-01 PROCEDURE — 93005 ELECTROCARDIOGRAM TRACING: CPT

## 2025-07-01 PROCEDURE — 85025 COMPLETE CBC W/AUTO DIFF WBC: CPT

## 2025-07-01 PROCEDURE — 73221 MRI JOINT UPR EXTREM W/O DYE: CPT

## 2025-07-01 PROCEDURE — 83036 HEMOGLOBIN GLYCOSYLATED A1C: CPT

## 2025-07-01 PROCEDURE — 73200 CT UPPER EXTREMITY W/O DYE: CPT

## 2025-07-01 PROCEDURE — 86140 C-REACTIVE PROTEIN: CPT

## 2025-07-01 PROCEDURE — 80053 COMPREHEN METABOLIC PANEL: CPT

## 2025-07-01 PROCEDURE — 85652 RBC SED RATE AUTOMATED: CPT

## 2025-07-01 PROCEDURE — 36415 COLL VENOUS BLD VENIPUNCTURE: CPT

## 2025-07-01 PROCEDURE — 85610 PROTHROMBIN TIME: CPT

## 2025-07-01 NOTE — PRE-PROCEDURE INSTRUCTIONS
Pre-Surgery Instructions:   Medication Instructions    atorvastatin (LIPITOR) 10 mg tablet Take day of surgery.    losartan-hydrochlorothiazide (HYZAAR) 100-25 MG per tablet Hold day of surgery.    sildenafil (VIAGRA) 100 mg tablet Uses PRN- DO NOT take day of surgery   Medication instructions for day of surgery reviewed. Please take all instructed medications with only a sip of water. Please do not take any over the counter (non-prescribed) vitamins or supplements for one week prior to date of surgery.      You will receive a call one business day prior to surgery with an arrival time and hospital directions. If your surgery is scheduled on a Monday, the hospital will be calling you on the Friday prior to your surgery. If you have not heard from anyone by 8pm, please call the hospital supervisor through the hospital  at [phone]. ([place] [phone] or [place] [phone]).    Do not eat or drink anything after midnight the night before your surgery, including candy, mints, lifesavers, or chewing gum. Do not drink alcohol 24hrs before your surgery. Try not to smoke at least 24hrs before your surgery.       Follow the pre surgery showering instructions as listed in the “My Surgical Experience Booklet” or otherwise provided by your surgeon's office. Do not use a blade to shave the surgical area 1 week before surgery. It is okay to use a clean electric clippers up to 24 hours before surgery. Do not apply any lotions, creams, including makeup, cologne, deodorant, or perfumes after showering on the day of your surgery. Do not use dry shampoo, hair spray, hair gel, or any type of hair products.     No contact lenses, eye make-up, or artificial eyelashes. Remove nail polish, including gel polish, and any artificial, gel, or acrylic nails if possible. Remove all jewelry including rings and body piercing jewelry.     Wear causal clothing that is easy to take on and off. Consider your type of  surgery.    Keep any valuables, jewelry, piercings at home. Please bring any specially ordered equipment (sling, braces) if indicated.    Arrange for a responsible person to drive you to and from the hospital on the day of your surgery. Please confirm the visitor policy for the day of your procedure when you receive your phone call with an arrival time.     Call the surgeon's office with any new illnesses, exposures, or additional questions prior to surgery.    Please reference your “My Surgical Experience Booklet” for additional information to prepare for your upcoming surgery.     No NSAIDs 1 week prior to surgery. Tylenol ok if needed.

## 2025-07-02 ENCOUNTER — OFFICE VISIT (OUTPATIENT)
Age: 65
End: 2025-07-02
Payer: COMMERCIAL

## 2025-07-02 VITALS
HEIGHT: 69 IN | WEIGHT: 142 LBS | SYSTOLIC BLOOD PRESSURE: 127 MMHG | DIASTOLIC BLOOD PRESSURE: 82 MMHG | HEART RATE: 84 BPM | BODY MASS INDEX: 21.03 KG/M2

## 2025-07-02 DIAGNOSIS — Z01.818 PREOPERATIVE CLEARANCE: Primary | ICD-10-CM

## 2025-07-02 DIAGNOSIS — S52.121K CLOSED DISPLACED FRACTURE OF HEAD OF RIGHT RADIUS WITH NONUNION, SUBSEQUENT ENCOUNTER: ICD-10-CM

## 2025-07-02 DIAGNOSIS — I10 HYPERTENSION, UNSPECIFIED TYPE: ICD-10-CM

## 2025-07-02 DIAGNOSIS — S52.501K CLOSED FRACTURE OF DISTAL END OF RIGHT RADIUS WITH NONUNION, UNSPECIFIED FRACTURE MORPHOLOGY, SUBSEQUENT ENCOUNTER: ICD-10-CM

## 2025-07-02 LAB
ATRIAL RATE: 88 BPM
EST. AVERAGE GLUCOSE BLD GHB EST-MCNC: 111 MG/DL
HBA1C MFR BLD: 5.5 %
P AXIS: 43 DEGREES
PR INTERVAL: 184 MS
QRS AXIS: 34 DEGREES
QRSD INTERVAL: 88 MS
QT INTERVAL: 350 MS
QTC INTERVAL: 424 MS
T WAVE AXIS: 55 DEGREES
VENTRICULAR RATE: 88 BPM

## 2025-07-02 PROCEDURE — 93010 ELECTROCARDIOGRAM REPORT: CPT | Performed by: INTERNAL MEDICINE

## 2025-07-02 PROCEDURE — 99204 OFFICE O/P NEW MOD 45 MIN: CPT | Performed by: NURSE PRACTITIONER

## 2025-07-10 ENCOUNTER — ANESTHESIA EVENT (OUTPATIENT)
Dept: PERIOP | Facility: HOSPITAL | Age: 65
End: 2025-07-10
Payer: COMMERCIAL

## 2025-07-10 ENCOUNTER — TELEPHONE (OUTPATIENT)
Age: 65
End: 2025-07-10

## 2025-07-10 NOTE — TELEPHONE ENCOUNTER
Caller: Patient     Doctor: Dr. Mcelroy    Reason for call: patient asking for sx instruction's on which medications he should not be taking please contact     Call back#: [phone]

## 2025-07-11 ENCOUNTER — APPOINTMENT (OUTPATIENT)
Dept: RADIOLOGY | Facility: HOSPITAL | Age: 65
End: 2025-07-11
Payer: COMMERCIAL

## 2025-07-11 ENCOUNTER — HOSPITAL ENCOUNTER (OUTPATIENT)
Facility: HOSPITAL | Age: 65
Setting detail: OUTPATIENT SURGERY
Discharge: HOME | End: 2025-07-11
Attending: ORTHOPAEDIC SURGERY | Admitting: ORTHOPAEDIC SURGERY
Payer: COMMERCIAL

## 2025-07-11 ENCOUNTER — ANESTHESIA (OUTPATIENT)
Dept: PERIOP | Facility: HOSPITAL | Age: 65
End: 2025-07-11
Payer: COMMERCIAL

## 2025-07-11 VITALS
DIASTOLIC BLOOD PRESSURE: 92 MMHG | WEIGHT: 135 LBS | RESPIRATION RATE: 20 BRPM | OXYGEN SATURATION: 98 % | TEMPERATURE: 97.4 F | HEART RATE: 82 BPM | SYSTOLIC BLOOD PRESSURE: 145 MMHG | BODY MASS INDEX: 19.99 KG/M2 | HEIGHT: 69 IN

## 2025-07-11 DIAGNOSIS — S52.121K CLOSED DISPLACED FRACTURE OF HEAD OF RIGHT RADIUS WITH NONUNION, SUBSEQUENT ENCOUNTER: Primary | ICD-10-CM

## 2025-07-11 PROBLEM — S52.123K: Status: ACTIVE | Noted: 2025-07-11

## 2025-07-11 PROCEDURE — 73080 X-RAY EXAM OF ELBOW: CPT

## 2025-07-11 PROCEDURE — 24149 RADICAL RESECTION OF ELBOW: CPT | Performed by: PHYSICIAN ASSISTANT

## 2025-07-11 PROCEDURE — 24149 RADICAL RESECTION OF ELBOW: CPT | Performed by: ORTHOPAEDIC SURGERY

## 2025-07-11 RX ORDER — CEFAZOLIN SODIUM 2 G/50ML
2000 SOLUTION INTRAVENOUS ONCE
Status: COMPLETED | OUTPATIENT
Start: 2025-07-11 | End: 2025-07-11

## 2025-07-11 RX ORDER — DEXAMETHASONE SODIUM PHOSPHATE 10 MG/ML
INJECTION, SOLUTION INTRAMUSCULAR; INTRAVENOUS AS NEEDED
Status: DISCONTINUED | OUTPATIENT
Start: 2025-07-11 | End: 2025-07-11

## 2025-07-11 RX ORDER — SODIUM CHLORIDE, SODIUM LACTATE, POTASSIUM CHLORIDE, CALCIUM CHLORIDE 600; 310; 30; 20 MG/100ML; MG/100ML; MG/100ML; MG/100ML
20 INJECTION, SOLUTION INTRAVENOUS CONTINUOUS
Status: DISCONTINUED | OUTPATIENT
Start: 2025-07-11 | End: 2025-07-11 | Stop reason: HOSPADM

## 2025-07-11 RX ORDER — CEPHALEXIN 500 MG/1
500 CAPSULE ORAL EVERY 6 HOURS SCHEDULED
Qty: 8 CAPSULE | Refills: 0 | Status: SHIPPED | OUTPATIENT
Start: 2025-07-11 | End: 2025-07-13

## 2025-07-11 RX ORDER — CHLORHEXIDINE GLUCONATE ORAL RINSE 1.2 MG/ML
15 SOLUTION DENTAL ONCE
Status: COMPLETED | OUTPATIENT
Start: 2025-07-11 | End: 2025-07-11

## 2025-07-11 RX ORDER — OXYCODONE AND ACETAMINOPHEN 5; 325 MG/1; MG/1
1 TABLET ORAL EVERY 4 HOURS PRN
Qty: 40 TABLET | Refills: 0 | Status: SHIPPED | OUTPATIENT
Start: 2025-07-11

## 2025-07-11 RX ORDER — EPHEDRINE SULFATE 50 MG/ML
INJECTION INTRAVENOUS AS NEEDED
Status: DISCONTINUED | OUTPATIENT
Start: 2025-07-11 | End: 2025-07-11

## 2025-07-11 RX ORDER — MIDAZOLAM HYDROCHLORIDE 2 MG/2ML
INJECTION, SOLUTION INTRAMUSCULAR; INTRAVENOUS
Status: COMPLETED | OUTPATIENT
Start: 2025-07-11 | End: 2025-07-11

## 2025-07-11 RX ORDER — HYDROMORPHONE HCL/PF 1 MG/ML
0.5 SYRINGE (ML) INJECTION
Status: DISCONTINUED | OUTPATIENT
Start: 2025-07-11 | End: 2025-07-11 | Stop reason: HOSPADM

## 2025-07-11 RX ORDER — MAGNESIUM HYDROXIDE 1200 MG/15ML
LIQUID ORAL AS NEEDED
Status: DISCONTINUED | OUTPATIENT
Start: 2025-07-11 | End: 2025-07-11 | Stop reason: HOSPADM

## 2025-07-11 RX ORDER — SODIUM CHLORIDE, SODIUM LACTATE, POTASSIUM CHLORIDE, CALCIUM CHLORIDE 600; 310; 30; 20 MG/100ML; MG/100ML; MG/100ML; MG/100ML
INJECTION, SOLUTION INTRAVENOUS CONTINUOUS PRN
Status: DISCONTINUED | OUTPATIENT
Start: 2025-07-11 | End: 2025-07-11

## 2025-07-11 RX ORDER — ONDANSETRON 2 MG/ML
INJECTION INTRAMUSCULAR; INTRAVENOUS AS NEEDED
Status: DISCONTINUED | OUTPATIENT
Start: 2025-07-11 | End: 2025-07-11

## 2025-07-11 RX ORDER — CHLORHEXIDINE GLUCONATE 40 MG/ML
SOLUTION TOPICAL DAILY PRN
Status: DISCONTINUED | OUTPATIENT
Start: 2025-07-11 | End: 2025-07-11 | Stop reason: HOSPADM

## 2025-07-11 RX ORDER — FENTANYL CITRATE 50 UG/ML
INJECTION, SOLUTION INTRAMUSCULAR; INTRAVENOUS AS NEEDED
Status: DISCONTINUED | OUTPATIENT
Start: 2025-07-11 | End: 2025-07-11

## 2025-07-11 RX ORDER — SODIUM CHLORIDE, SODIUM LACTATE, POTASSIUM CHLORIDE, CALCIUM CHLORIDE 600; 310; 30; 20 MG/100ML; MG/100ML; MG/100ML; MG/100ML
125 INJECTION, SOLUTION INTRAVENOUS CONTINUOUS
Status: DISCONTINUED | OUTPATIENT
Start: 2025-07-11 | End: 2025-07-11 | Stop reason: HOSPADM

## 2025-07-11 RX ORDER — VANCOMYCIN HYDROCHLORIDE 1 G/20ML
INJECTION, POWDER, LYOPHILIZED, FOR SOLUTION INTRAVENOUS AS NEEDED
Status: DISCONTINUED | OUTPATIENT
Start: 2025-07-11 | End: 2025-07-11 | Stop reason: HOSPADM

## 2025-07-11 RX ORDER — METOCLOPRAMIDE HYDROCHLORIDE 5 MG/ML
10 INJECTION INTRAMUSCULAR; INTRAVENOUS ONCE AS NEEDED
Status: DISCONTINUED | OUTPATIENT
Start: 2025-07-11 | End: 2025-07-11 | Stop reason: HOSPADM

## 2025-07-11 RX ORDER — PROPOFOL 10 MG/ML
INJECTION, EMULSION INTRAVENOUS AS NEEDED
Status: DISCONTINUED | OUTPATIENT
Start: 2025-07-11 | End: 2025-07-11

## 2025-07-11 RX ORDER — BUPIVACAINE HYDROCHLORIDE 5 MG/ML
INJECTION, SOLUTION EPIDURAL; INTRACAUDAL; PERINEURAL
Status: COMPLETED | OUTPATIENT
Start: 2025-07-11 | End: 2025-07-11

## 2025-07-11 RX ORDER — FENTANYL CITRATE/PF 50 MCG/ML
50 SYRINGE (ML) INJECTION
Status: DISCONTINUED | OUTPATIENT
Start: 2025-07-11 | End: 2025-07-11 | Stop reason: HOSPADM

## 2025-07-11 RX ORDER — OXYCODONE HYDROCHLORIDE 5 MG/1
5 TABLET ORAL EVERY 4 HOURS PRN
Qty: 30 TABLET | Refills: 0 | Status: SHIPPED | OUTPATIENT
Start: 2025-07-11 | End: 2025-07-11

## 2025-07-11 RX ORDER — TRANEXAMIC ACID 10 MG/ML
1000 INJECTION, SOLUTION INTRAVENOUS ONCE
Status: COMPLETED | OUTPATIENT
Start: 2025-07-11 | End: 2025-07-11

## 2025-07-11 RX ORDER — LIDOCAINE HYDROCHLORIDE 10 MG/ML
INJECTION, SOLUTION EPIDURAL; INFILTRATION; INTRACAUDAL; PERINEURAL AS NEEDED
Status: DISCONTINUED | OUTPATIENT
Start: 2025-07-11 | End: 2025-07-11

## 2025-07-11 RX ADMIN — CHLORHEXIDINE GLUCONATE 0.12% ORAL RINSE 15 ML: 1.2 LIQUID ORAL at 10:50

## 2025-07-11 RX ADMIN — CEFAZOLIN SODIUM 2000 MG: 2 SOLUTION INTRAVENOUS at 12:58

## 2025-07-11 RX ADMIN — FENTANYL CITRATE 50 MCG: 50 INJECTION, SOLUTION INTRAMUSCULAR; INTRAVENOUS at 13:34

## 2025-07-11 RX ADMIN — SODIUM CHLORIDE, SODIUM LACTATE, POTASSIUM CHLORIDE, AND CALCIUM CHLORIDE: .6; .31; .03; .02 INJECTION, SOLUTION INTRAVENOUS at 12:47

## 2025-07-11 RX ADMIN — PHENYLEPHRINE HYDROCHLORIDE 40 MCG/MIN: 10 INJECTION INTRAVENOUS at 13:50

## 2025-07-11 RX ADMIN — LIDOCAINE HYDROCHLORIDE 50 MG: 10 INJECTION, SOLUTION EPIDURAL; INFILTRATION; INTRACAUDAL at 12:51

## 2025-07-11 RX ADMIN — MIDAZOLAM HYDROCHLORIDE 2 MG: 1 INJECTION, SOLUTION INTRAMUSCULAR; INTRAVENOUS at 11:35

## 2025-07-11 RX ADMIN — DEXAMETHASONE SODIUM PHOSPHATE 10 MG: 10 INJECTION, SOLUTION INTRAMUSCULAR; INTRAVENOUS at 12:58

## 2025-07-11 RX ADMIN — EPHEDRINE SULFATE 5 MG: 50 INJECTION, SOLUTION INTRAVENOUS at 14:43

## 2025-07-11 RX ADMIN — EPHEDRINE SULFATE 5 MG: 50 INJECTION, SOLUTION INTRAVENOUS at 13:43

## 2025-07-11 RX ADMIN — ONDANSETRON 4 MG: 2 INJECTION INTRAMUSCULAR; INTRAVENOUS at 13:21

## 2025-07-11 RX ADMIN — SODIUM CHLORIDE, SODIUM LACTATE, POTASSIUM CHLORIDE, AND CALCIUM CHLORIDE: .6; .31; .03; .02 INJECTION, SOLUTION INTRAVENOUS at 13:41

## 2025-07-11 RX ADMIN — FENTANYL CITRATE 50 MCG: 50 INJECTION, SOLUTION INTRAMUSCULAR; INTRAVENOUS at 12:58

## 2025-07-11 RX ADMIN — EPHEDRINE SULFATE 5 MG: 50 INJECTION, SOLUTION INTRAVENOUS at 13:39

## 2025-07-11 RX ADMIN — TRANEXAMIC ACID 1000 MG: 10 INJECTION, SOLUTION INTRAVENOUS at 12:56

## 2025-07-11 RX ADMIN — PROPOFOL 200 MG: 10 INJECTION, EMULSION INTRAVENOUS at 12:51

## 2025-07-11 RX ADMIN — BUPIVACAINE 20 ML: 13.3 INJECTION, SUSPENSION, LIPOSOMAL INFILTRATION at 11:35

## 2025-07-11 RX ADMIN — BUPIVACAINE HYDROCHLORIDE 10 ML: 5 INJECTION, SOLUTION EPIDURAL; INTRACAUDAL; PERINEURAL at 11:35

## 2025-07-11 NOTE — INTERVAL H&P NOTE
H&P reviewed. After examining the patient I find no changes in the patients condition since the H&P had been written.    Vitals:    07/11/25 1118   BP: (!) 173/90   Pulse: 71   Resp: 15   Temp: 98 °F (36.7 °C)   SpO2: 100%     Plan for right elbow open osteocapsular arthroplasty and excision of radial head today

## 2025-07-11 NOTE — OP NOTE
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   2.   excision of radial head.   3.   decrompression of ganglion cyst.   4.   loose body removal.   5.   manipulation under anesthesia  6.  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  4 x 5 cm ganglion cyst antecubital fossa.  Multiple osteochondral loose bodies in the anterior and posterior compartments of the elbow.  Grade 3-4 chondral changes radiocapitellar and ulnohumeral joint.       Complications:   None    Procedure and Technique:  The patient was seen in the preoperative holding area where the operative extremity marked.  He was taken to the operating room and placed in the supine position.  His operative extremity was prepped and draped in usual sterile fashion.  A timeout was called patient was ministered Ancef 2 g IV prior to incision.  Using a 15 blade knife a skin incision made over the radiocapitellar joint laterally.  The fascia was then identified and an EDC split approach to the elbow was performed.  The right capitellar joint was then identified and was found that the radial head was comminuted and had a nonunion.  There  were multiple fragments that was removed which showed significant deformity and chronic deformation.  The radial tuberosity was palpated and an E flux of gelatinous yellow fluid was expressed from the joint where the antecubital fossa cyst was located.  The swelling of the antecubital fossa was decompressed and resolved after all the gelatinous fluid was evacuated.  Further finger palpation in this area was performed to remove any other cyst fluid completing the decompression of the ganglion cyst.  Attention was then brought to the anterior compartment of the elbow.  Significant thickening of the anterior capsule was identified.  The anterior capsule was released from the anterior humeral cortex to allow for further extension of the patient's elbow.  There was a small 1 x 1 mm loose body that was removed from the anterior compartment.  Further medial and anterior compartment there was a large osteochondral loose body that was adhesed to the anterior capsule and meticulous dissection with a key elevator and curettes were performed to remove this large loose body that measured approximately 3 x 2 cm.  Further evaluation finger palpation demonstrated that the capsule was fully released and there is no other loose bodies in the anterior capsule.  Attention was then brought to the radial shaft.  The radial canal was obliterated from chronic scar tissue and a 2.0 mm drill bit was used to open up the radial canal.  A Kocher was then placed into the canal and out on the cortex to manipulate the radial shaft.  Under fluoroscopic imaging axial stress on the radius was performed and showed that there was no instability in the proximal or distal stress and there is no signs of proximal or distal migration of the radius at the elbow or at the wrist..  This demonstrated that the interosseous ligament was significant scarred and showed that the radius was stable.  At this point it was decided that the radial head arthroplasty was  not necessary.  Attention was then brought to the posterior compartment the elbow.  The interval between the triceps and posterior humeral cortex was developed with a 15 blade knife.  The posterior fat pad was excised with a rongeur.  There were at least 4 large osteochondral loose bodies that were in the olecranon fossa that were removed.  There was a osteophyte of the olecranon tip that was excised with use of a osteotome and a rongeur.  The posterior capsule was also released off the posterior humeral cortex with a key elevator to release any contracture of the elbow for flexion.  Once all this was completed intraoperative x-rays demonstrated no signs of any loose bodies in the elbow.  The elbow was then placed through range of motion and a manipulation under anesthesia was performed.  The elbow was able to be flexed to near full extension to approximate 5 degrees of full extension.  The elbow was able to fully flex to 140 degrees.  The wound and joint was copiously irrigated with normal saline.  The tourniquet was taken down and hemostasis was undertaken with Bovie cautery.  Vancomycin powder was placed deep to the wound.  The annular ligament was repaired to its dorsal and ventral leaflets with 0 Vicryl.  The fascia was closed with 0 Vicryl in a running [place] stitch.  The skin was closed with 2-0 and 3-0 Monocryl.  Exofin was placed.  Mepilex dressing was placed.  The patient was placed in regular sling.  There were no complications throughout the case.  The patient tolerated the procedure well.    The case was deemed complex compared to the typical elbow contracture release given the large, adhesed loose bodies to the anterior capsule with close proximity to the neurovascular structures that required meticulous dissection as well as the excision of the nonunion of the radial head that required stress testing to evaluate for need of radial head arthroplasty.  Also the case was complex due to the chronicity of  the patient's radial head fracture and elbow contracture requiring more surgical time and intensity.   I was present for the entire procedure., A qualified resident physician was not available., and A physician assistant was required during the procedure for retraction, tissue handling, dissection and suturing.    Patient Disposition:  PACU          SIGNATURE: Karly Mcelroy DO  DATE: July 11, 2025  TIME: 3:05 PM

## 2025-07-11 NOTE — ANESTHESIA PROCEDURE NOTES
Peripheral Block    Patient location during procedure: holding area  Start time: 7/11/2025 11:35 AM  Reason for block: at surgeon's request and post-op pain management  Staffing  Performed by: Walter Davis MD  Authorized by: Walter Davis MD    Preanesthetic Checklist  Completed: patient identified, IV checked, site marked, risks and benefits discussed, surgical consent, monitors and equipment checked, pre-op evaluation and timeout performed  Peripheral Block  Patient position: supine  Prep: ChloraPrep  Patient monitoring: frequent blood pressure checks, continuous pulse oximetry and heart rate  Block type: Supraclavicular  Laterality: right  Injection technique: single-shot  Procedures: ultrasound guided, Ultrasound guidance required for the procedure to increase accuracy and safety of medication placement and decrease risk of complications.  Ultrasound permanent image saved  bupivacaine (PF) (MARCAINE) 0.5 % injection 20 mL - Perineural   10 mL - 7/11/2025 11:35:00 AM  bupivacaine liposomal (EXPAREL) 1.3 % injection 20 mL - Perineural   20 mL - 7/11/2025 11:35:00 AM  midazolam (VERSED) injection 0.5 mg - Intravenous   2 mg - 7/11/2025 11:35:00 AM  Needle  Needle type: Stimuplex   Needle gauge: 20 G  Needle length: 4 in  Needle localization: anatomical landmarks and ultrasound guidance  Assessment  Injection assessment: incremental injection, frequent aspiration, injected with ease, negative aspiration, negative for heart rate change, no paresthesia on injection, no symptoms of intraneural/intravenous injection and needle tip visualized at all times  Paresthesia pain: none  Post-procedure:  site cleaned  patient tolerated the procedure well with no immediate complications

## 2025-07-11 NOTE — ANESTHESIA POSTPROCEDURE EVALUATION
Post-Op Assessment Note    CV Status:  Stable  Pain Score: 0    Pain management: adequate       Mental Status:  Sleepy   Hydration Status:  Euvolemic   PONV Controlled:  Controlled   Airway Patency:  Patent  Two or more mitigation strategies used for obstructive sleep apnea   Post Op Vitals Reviewed: Yes    No anethesia notable event occurred.    Staff: CRNA           Last Filed PACU Vitals:  Vitals Value Taken Time   Temp 97.5    Pulse 66 07/11/25 15:07   /64 07/11/25 15:06   Resp 15 07/11/25 15:07   SpO2 99 % 07/11/25 15:07   Vitals shown include unfiled device data.

## 2025-07-11 NOTE — ANESTHESIA POSTPROCEDURE EVALUATION
Post-Op Assessment Note    CV Status:  Stable  Pain Score: 0    Pain management: adequate       Mental Status:  Alert and awake   Hydration Status:  Euvolemic   PONV Controlled:  Controlled   Airway Patency:  Patent     Post Op Vitals Reviewed: Yes    No anethesia notable event occurred.            Last Filed PACU Vitals:  Vitals Value Taken Time   Temp 97.5 °F (36.4 °C) 07/11/25 15:20   Pulse 87 07/11/25 15:23   /91 07/11/25 15:21   Resp 30 07/11/25 15:22   SpO2 97 % 07/11/25 15:23   Vitals shown include unfiled device data.    Modified Mak:     Vitals Value Taken Time   Activity 2 07/11/25 15:20   Respiration 2 07/11/25 15:20   Circulation 2 07/11/25 15:20   Consciousness 2 07/11/25 15:20   Oxygen Saturation 2 07/11/25 15:20     Modified Mak Score: 10

## 2025-07-11 NOTE — TELEPHONE ENCOUNTER
Caller: Bandar from [facility]    Doctor: Dr. Mcelroy/ Bong Arias    Reason for call: Pharmacist is request a new script / Oxycodone 5mg is on back order / / requesting a script for Oxycodone with tylenol 3.25 // please advise and thank you         [facility] #[id number] - [place], [place] - [address] [phone]        Call back#: [phone]

## 2025-07-11 NOTE — DISCHARGE INSTR - AVS FIRST PAGE
Karly Mcelroy DO    Orthopedic Surgery, Shoulder/Elbow and Sports Medicine  [place] [address] [address] [place] [address], [place] [address]  Phone: [phone]    General Post-op Surgical Instructions:    Date of Procedure - 07/11/25     Procedure - Right elbow open osteocapsular arthroplasty and excision radial head with excision ganglion cyst today    Weight Bearing Status - no lifting right arm    DVT Prophylaxis and Duration - not requires    PT/OT Instructions - start OT next week for early elbow ROM    Stitches/Staples - Will be removed at 7-10 days postop; we will then place steristrips on incision site    Wound Care - maintain dressing, keep clean and dry    Xray follow up - to be done at or prior to office visit follow-up if indicated    Additional Info - Please complete your course of antibiotics     Any questions or concerns please call [phone] please!

## 2025-07-11 NOTE — ANESTHESIA PREPROCEDURE EVALUATION
Procedure:  open RELEASE elbow CONTRACTURE, excision of radial head, possible radial head arthroplasty, excision of ganglion cyst and all necessary procedures (Right: Elbow)    Relevant Problems   CARDIO   (+) HTN (hypertension)   (+) Hyperlipidemia        Physical Exam    Airway     Mallampati score: I  TM Distance: >3 FB  Neck ROM: full      Cardiovascular      Dental   No notable dental hx     Pulmonary      Neurological      Other Findings        Anesthesia Plan  ASA Score- 2     Anesthesia Type- IV sedation with anesthesia and general with ASA Monitors.         Additional Monitors:     Airway Plan:            Plan Factors-Exercise tolerance (METS): >4 METS.    Chart reviewed. EKG reviewed.  Existing labs reviewed. Patient summary reviewed.    Patient is not a current smoker.      There is medical exclusion for perioperative obstructive sleep apnea risk education.        Induction- intravenous.    Postoperative Plan- Plan for postoperative opioid use.   Monitoring Plan -   Post Operative Pain Plan - plan for postoperative opioid use        Informed Consent- Anesthetic plan and risks discussed with patient.  I personally reviewed this patient with the CRNA. Discussed and agreed on the Anesthesia Plan with the CRNA..      NPO Status:  Vitals Value Taken Time   Date of last liquid 07/10/25 07/11/25 10:35   Time of last liquid 2100 07/11/25 10:35   Date of last solid 07/10/25 07/11/25 10:35   Time of last solid 1800 07/11/25 10:35

## 2025-07-16 ENCOUNTER — OFFICE VISIT (OUTPATIENT)
Dept: OBGYN CLINIC | Facility: CLINIC | Age: 65
End: 2025-07-16

## 2025-07-16 VITALS — WEIGHT: 135 LBS | BODY MASS INDEX: 19.99 KG/M2 | HEIGHT: 69 IN

## 2025-07-16 DIAGNOSIS — S52.121K CLOSED DISPLACED FRACTURE OF HEAD OF RIGHT RADIUS WITH NONUNION, SUBSEQUENT ENCOUNTER: Primary | ICD-10-CM

## 2025-07-16 DIAGNOSIS — M24.521 CONTRACTURE OF ELBOW JOINT, RIGHT: ICD-10-CM

## 2025-07-16 PROCEDURE — 99024 POSTOP FOLLOW-UP VISIT: CPT | Performed by: PHYSICIAN ASSISTANT

## 2025-07-16 NOTE — PROGRESS NOTES
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  Patient is doing well he may shower but do not soak or scrub the incision.  Will see him back in 5 weeks.  He starts therapy tomorrow.       Contracture of elbow joint, right                HISTORY:  64 y.o. male presents for postop visit 5 days status post right elbow release of contracture and excision of radial head with removal of loose bodies and manipulation.  Patient is doing very well.  He reports pain is controlled in the elbow.  He notes some stiffness given his swelling.  He had some numbness and tingling in the small finger but that has resolved.    Past Medical History[1]    Past Surgical History[2]    Social History     Socioeconomic History    Marital status: /Civil Union     Spouse name: Not on file    Number of children: Not on file    Years of education: Not on file    Highest education level: Not on file   Occupational History    Not on file   Tobacco Use    Smoking status: Never    Smokeless tobacco: Never   Vaping Use    Vaping status: Never Used   Substance and Sexual Activity    Alcohol use: Yes     Alcohol/week: 10.0 standard drinks of alcohol     Types: 10 Cans of beer per week    Drug use: Not Currently     Types: Marijuana    Sexual activity: Not on file   Other Topics Concern    Not on file   Social History Narrative    Not on file     Social Drivers of Health     Financial Resource Strain: Low Risk  (9/12/2023)    Received from [facility]    Overall Financial Resource Strain (CARDIA)     Difficulty of Paying  Living Expenses: Not hard at all   Food Insecurity: No Food Insecurity (7/11/2025)    Nursing - Inadequate Food Risk Classification     Worried About Running Out of Food in the Last Year: Not on file     Ran Out of Food in the Last Year: Not on file     Ran Out of Food in the Last Year: Never true   Transportation Needs: No Transportation Needs (7/11/2025)    Nursing - Transportation Risk Classification     Lack of Transportation: Not on file     Lack of Transportation: No   Physical Activity: Not on file   Stress: No Stress Concern Present (9/12/2023)    Received from [facility] - Occupational Stress Questionnaire     Feeling of Stress : Only a little   Social Connections: Feeling Socially Integrated (9/18/2024)    Received from [facility]    OASIS : Social Isolation     How often do you feel lonely or isolated from those around you?: Never   Intimate Partner Violence: Unknown (7/11/2025)    Nursing IPS     Feels Physically and Emotionally Safe: Not on file     Physically Hurt by Someone: Not on file     Humiliated or Emotionally Abused by Someone: Not on file     Physically Hurt by Someone: No     Hurt or Threatened by Someone: No   Housing Stability: Unknown (7/11/2025)    Nursing: Inadequate Housing Risk Classification     Has Housing: Not on file     Worried About Losing Housing: Not on file     Unable to Get Utilities: Not on file     Unable to Pay for Housing in the Last Year: No     Has Housing: No       Family History[3]     Patient's Medications   New Prescriptions    No medications on file   Previous Medications    ATORVASTATIN (LIPITOR) 10 MG TABLET    Take 10 mg by mouth in the morning.    LOSARTAN-HYDROCHLOROTHIAZIDE (HYZAAR) 100-25 MG PER TABLET    Take 1 tablet by mouth in the morning.    OXYCODONE-ACETAMINOPHEN (PERCOCET) 5-325 MG PER TABLET    Take 1 tablet by mouth every 4 (four) hours as needed for moderate pain  "Max Daily Amount: 6 tablets    SILDENAFIL (VIAGRA) 100 MG TABLET    TAKE 1 TABLET DAILY AS NEEDED FOR ERECTILE DYSFUNCTION   Modified Medications    No medications on file   Discontinued Medications    No medications on file       Allergies[4]     Ht 5' 9\" (1.753 m)   Wt 61.2 kg (135 lb)   BMI 19.94 kg/m²      REVIEW OF SYSTEMS:  Constitutional: Negative.    HEENT: Negative.    Respiratory: Negative.    Skin: Negative.    Neurological: Negative.    Psychiatric/Behavioral: Negative.  Musculoskeletal: Negative except for that mentioned in the HPI.    Gen: No acute distress, appears comfortable at rest  HEENT: Eyes clear, moist mucus membranes, hearing intact  Respiratory: No audible wheezing or stridor  Cardiovascular: Well Perfused peripherally, 2+ distal pulse  Abdomen: nondistended, no peritoneal signs     PHYSICAL EXAM:    Right elbow:  Incision is clean dry intact with glue  No erythema or warmth  Motion degrees short of full extension.  Active flexion and 90 passive flexion to 110  Full supination and pronation  Sensation tact the median, radial, ulnar nerves  Motor intact AIN, PIN, IO nerves  Brisk capillary refill  IMAGING: No new x-rays today.  Intraoperative images reviewed with the patient.         [1]   Past Medical History:  Diagnosis Date    Hyperlipidemia     Hypertension    [2]   Past Surgical History:  Procedure Laterality Date    COLONOSCOPY      KNEE ARTHROSCOPY Left     IA EXC TUMOR SOFT TISS UPR ARM/ELBOW SUBFASC <5CM Right 7/11/2025    Procedure: open RELEASE elbow CONTRACTURE, excision of radial head, decrompression of ganglion cyst, loose body removal, manipulation under anesthesia;  Surgeon: Karly Mcelroy DO;  Location:  MAIN OR;  Service: Orthopedics    IA OPEN TX RADIAL HEAD/NECK FRACTURE PROSTHETIC Right 7/11/2025    Procedure: open RELEASE elbow CONTRACTURE, excision of radial head, decrompression of ganglion cyst, loose body removal, manipulation under anesthesia;  Surgeon: Karly" DO Lilibeth;  Location:  MAIN OR;  Service: Orthopedics    MI RAD RESCJ CAPSL TISS&HTRTPC B1 ELBW CONTRCT RLS Right 7/11/2025    Procedure: open RELEASE elbow CONTRACTURE, excision of radial head, decrompression of ganglion cyst, loose body removal, manipulation under anesthesia;  Surgeon: Karly Mcelroy DO;  Location:  MAIN OR;  Service: Orthopedics   [3] No family history on file.  [4] No Known Allergies

## 2025-07-16 NOTE — ASSESSMENT & PLAN NOTE
5 days status post right elbow release of contracture and removal of loose bodies with excision of radial head.  Patient is doing well he may shower but do not soak or scrub the incision.  Will see him back in 5 weeks.  He starts therapy tomorrow.

## 2025-07-17 ENCOUNTER — EVALUATION (OUTPATIENT)
Dept: OCCUPATIONAL THERAPY | Facility: CLINIC | Age: 65
End: 2025-07-17
Attending: PHYSICIAN ASSISTANT
Payer: COMMERCIAL

## 2025-07-17 DIAGNOSIS — S52.121K CLOSED DISPLACED FRACTURE OF HEAD OF RIGHT RADIUS WITH NONUNION, SUBSEQUENT ENCOUNTER: ICD-10-CM

## 2025-07-17 PROCEDURE — 97110 THERAPEUTIC EXERCISES: CPT

## 2025-07-17 PROCEDURE — 97165 OT EVAL LOW COMPLEX 30 MIN: CPT

## 2025-07-17 NOTE — PROGRESS NOTES
OT Evaluation     Today's date: 2025  Patient name: Cheikh Pappas  : 1960  MRN: [id number]  Referring provider: Bong Arias PA-C  Dx:   Encounter Diagnosis     ICD-10-CM    1. 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, subsequent encounter  S52.121K Ambulatory Referral to Occupational Therapy          Start Time: 1750  Stop Time: 183  Total time in clinic (min): 45 minutes    Assessment  Impairments: abnormal or restricted ROM, abnormal movement, activity intolerance, impaired physical strength, lacks appropriate home exercise program, pain with function, weight-bearing intolerance, unable to perform ADL, activity limitations and endurance  Other impairment: edema, healing incision  Functional limitations: difficulty with ADLs, IADLs, leisure activities secondary to ROM limitations and restrictions set by MD  Symptom irritability: moderate    Assessment details: Cheikh Pappas is a 64 y.o., Right HD male referred to hand therapy s/p R radial head resection, joint capsule release, removal of loose bodies, ganglion cyst excision, and joint manipulation.  Onset of injury >10 years ago due to a nonunion of a R radial head fracture that was untreated.  Patient presents 25 with impaired ROM, strength, and coordination of the RUE.  Deficits also noted in pain, edema and functional use of the right UE. Patient has an incision/wound on the lateral elbow and forearm that is healing well with no signs or symptoms of infection. He was provided with an initial HEP focused on elbow A/AAROM, FA AROM, and wrist AROM. He was issued scar massage to periphery of incision and TubiGrip for compression. Patient is a good candidate for OT services to decrease pain and edema and restore ROM, strength, coordination, and function for a return to independence in daily tasks.   Understanding of Dx/Px/POC: excellent     Prognosis: good    Goals  STGs (4 weeks)  Patient will be independent in  implementing HEP prescribed by therapist  Patient will report an average pain level of 2/10  Patient will demonstrate active wrist extension to 60 degrees for improved use of wrist in light ADLs  Patient will demonstrate active elbow flexion/extension to 125/-15  Promote proper scar healing to prevent scar adhesion and infection  Decrease edema by 50% at elbow as evident by circumferential measurements.  LTGs (10 weeks)  Patient will demonstrate independence in a HEP to maintain ROM, strength, and function at discharge  Patient will report an average pain level of 1/10 to be independent in daily tasks  Patient will demonstrate AROM of the elbow and wrist WFL to be independent in self care tasks  Patient will demonstrate 5/5 muscle strength in the elbow and forearm to be MI for meal prep  Patient will demonstrate  strength >/= L hand for improved use of R hand in golf  Patient will achieve goals as demonstrated by FOTO results  Patient will demonstrate 90% resolution of edema     Plan  Patient would benefit from: skilled occupational therapy and home program  Planned modality interventions: thermotherapy: hydrocollator packs and cryotherapy    Planned therapy interventions: IASTM, kinesiology taping, joint mobilization, manual therapy, massage, neuromuscular re-education, strengthening, stretching, therapeutic activities, therapeutic exercise, home exercise program, graded exercise, graded activity, functional ROM exercises, flexibility, coordination and compression    Frequency: 2x week  Duration in weeks: 10  Plan of Care beginning date: 7/17/2025  Plan of Care expiration date: 9/25/2025  Treatment plan discussed with: patient        Subjective Evaluation    History of Present Illness  Date of surgery: 7/11/2025  Mechanism of injury: surgery  Mechanism of injury: Cheikh Pappas is a 64 y.o. male referred to OT s/p R radial head resection, elbow joint capsule release, removal of loose bodies, and manipulation  "on 25. 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 He re-aggravated his pain in February. He has made improvement since this injury but continued to have pain and stiffness. He attended OT in our clinic for one session in May for an HEP. He elected for surgery due to continued pain and dysfunction.  He presents today with minimal pain but reports significant swelling at his elbow. He has stiffness with flexion and extension and has refrained from lifting.  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     Right Wrist/Hand   Negative for edema.     Additional Observation Details  Incision well healed. Mild scabbing along incision. No s/s of infection. Moderate edema noted    Neurological Testing     Additional Neurological Details  Patient admits to N/T the first few days post-op but denies any today,    Active Range of Motion     Right Shoulder   Normal active range of motion    Left Elbow   Flexion: 136 degrees   Extension: 0 degrees   Forearm supination: 76 degrees   Forearm pronation: 87 degrees     Right Elbow   Flexion: 119 degrees   Extension: -21 degrees "   Forearm supination: 76 degrees   Forearm pronation: 85 degrees     Left Wrist   Wrist flexion: 72 degrees   Wrist extension: 68 degrees     Right Wrist   Wrist flexion: 70 degrees   Wrist extension: 56 degrees     Additional Active Range of Motion Details  D2-5 and thumb WNLs    Swelling   Left Elbow Girth Measurements   Girth at joint line (cm): 29.8.    Right Elbow Girth Measurements   Girth at joint line (cm): 31.1.             Precautions: R elbow radial head excision, joint capsule release, removal of loose bodies, and manipulation DOS 7/11/25  POC expires Unit limit Auth  expiration date PT/OT + Visit Limit?   9/25/25 1/12/26 30 PT/OT                 Visit/Unit Tracking  AUTH Status:  Date 7/17 IE              30 pcy, 1 used 5/1. $30 copay. 9v auth'd Used 2               Remaining  28                   Manuals 7/17 IE        Edema control Size E TG, MEM NV        Scar/incision care Periphery of scar; demo for HEP                          Neuro Re-Ed                                                                        Ther Ex         Elbow A/AAROM Ext/flex AROM and AAROM 2x10 ea        FA A/AAROM P/S 2x10        Wrist A/AAROM Ext/flex 2x10        Maze         Ball rolls for elbow and wrist         Cones p/s         Pulleys                  Ther Activity                           HEP                           Modalities         P 5'

## 2025-07-22 ENCOUNTER — OFFICE VISIT (OUTPATIENT)
Dept: OCCUPATIONAL THERAPY | Facility: CLINIC | Age: 65
End: 2025-07-22
Payer: COMMERCIAL

## 2025-07-22 DIAGNOSIS — S52.121K CLOSED DISPLACED FRACTURE OF HEAD OF RIGHT RADIUS WITH NONUNION, SUBSEQUENT ENCOUNTER: Primary | ICD-10-CM

## 2025-07-22 PROCEDURE — 97140 MANUAL THERAPY 1/> REGIONS: CPT

## 2025-07-22 PROCEDURE — 97110 THERAPEUTIC EXERCISES: CPT

## 2025-07-22 NOTE — PROGRESS NOTES
"Daily Note     Today's date: 2025  Patient name: Cheikh Pappas  : 1960  MRN: [id number]  Referring provider: Bong Arias PA-C  Dx:   Encounter Diagnosis     ICD-10-CM    1. 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 He is gaining flexion back but is still struggling with extension.      Objective: See treatment diary below  Right Elbow   Flexion: 131 degrees (+12)   Extension: -15 degrees (+6)      Assessment: Tolerated treatment well. Patient demonstrated fatigue post treatment, exhibited good technique with therapeutic exercises, and would benefit from continued OT. Patient demonstrating good improvements in elbow AROM. He experienced increased soreness post-tx but did not have any observable increase in edema. He was instructed to use ice should his edema worsen before his his next appointment.      Plan: Continue per plan of care.      Precautions: R elbow radial head excision, joint capsule release, removal of loose bodies, and manipulation DOS 25  POC expires Unit limit Auth  expiration date PT/OT + Visit Limit?   25 30 PT/OT                 Visit/Unit Tracking  AUTH Status:  Date  IE              30 pcy, 1 used . $30 copay. 9v auth'd Used 2 3              Remaining  28 27                  Manuals  IE        Edema control Size E TG, MEM NV 5' MEM       Scar/incision care Periphery of scar; demo for HEP 5' periphery of incision                         Neuro Re-Ed                                                                        Ther Ex         Elbow A/AAROM Ext/flex AROM and AAROM 2x10 ea Cane AA 10 x 5\"    Elbow ext with gravity assist at edge of table 2x10       FA A/AAROM P/S 2x10 x20       Wrist A/AAROM Ext/flex 2x10 X20 " "ext/flex       Maze  x8       Ball rolls for elbow and wrist  Lg ex ball on table for elbow flex/ext 10 x 5\"       Cones p/s  X2 sets       Pulleys  4'                Ther Activity                           HEP  Gravity assist ext on edge of table                          Modalities         MHP 5' 5'                        "

## 2025-07-24 ENCOUNTER — OFFICE VISIT (OUTPATIENT)
Dept: OCCUPATIONAL THERAPY | Facility: CLINIC | Age: 65
End: 2025-07-24
Payer: COMMERCIAL

## 2025-07-24 DIAGNOSIS — S52.121K CLOSED DISPLACED FRACTURE OF HEAD OF RIGHT RADIUS WITH NONUNION, SUBSEQUENT ENCOUNTER: Primary | ICD-10-CM

## 2025-07-24 PROCEDURE — 97110 THERAPEUTIC EXERCISES: CPT

## 2025-07-24 PROCEDURE — 97140 MANUAL THERAPY 1/> REGIONS: CPT

## 2025-07-24 NOTE — PROGRESS NOTES
"Daily Note     Today's date: 2025  Patient name: Cheikh Pappas  : 1960  MRN: [id number]  Referring provider: Bong Arias PA-C  Dx:   Encounter Diagnosis     ICD-10-CM    1. 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      Assessment: Tolerated treatment well. Patient exhibited good technique with therapeutic exercises and would benefit from continued OT. Patient had soreness with cone exercise reaching behind head for elbow flexion today but overall tolerated treatment. Flexion ROM and edema continue to gradually improve.      Plan: Continue per plan of care.      Precautions: R elbow radial head excision, joint capsule release, removal of loose bodies, and manipulation DOS 25  POC expires Unit limit Auth  expiration date PT/OT + Visit Limit?   25 30 PT/OT                 Visit/Unit Tracking  AUTH Status:  Date  IE             30 pcy, 1 used . $30 copay. 9v auth'd Used 2 3 4             Remaining  28 27 26                 Manuals  IE       Edema control Size E TG, MEM NV 5' MEM 5' MEM      Scar/incision care Periphery of scar; demo for HEP 5' periphery of incision 5' periphery and over incision                        Neuro Re-Ed                                                                        Ther Ex         Elbow A/AAROM Ext/flex AROM and AAROM 2x10 ea Cane AA 10 x 5\"    Elbow ext with gravity assist at edge of table 2x10 Cones behind head and extend to place on half wall 4'    Elbow ext with " "gravity assist edge of table 3x10      FA A/AAROM P/S 2x10 x20 x20      Wrist A/AAROM Ext/flex 2x10 X20 ext/flex x20      Maze  x8 x8      Ball rolls for elbow and wrist  Lg ex ball on table for elbow flex/ext 10 x 5\" Lg ex ball on table elbow ext 10 x 5\"      Cones p/s  X2 sets X2 sets      Pulleys  4' 4'               Ther Activity                           HEP  Gravity assist ext on edge of table                          Modalities         MHP 5' 5' 7' elbow ext over edge of table with gravity assist                         "

## 2025-07-29 ENCOUNTER — OFFICE VISIT (OUTPATIENT)
Dept: OCCUPATIONAL THERAPY | Facility: CLINIC | Age: 65
End: 2025-07-29
Payer: COMMERCIAL

## 2025-07-29 DIAGNOSIS — S52.121K CLOSED DISPLACED FRACTURE OF HEAD OF RIGHT RADIUS WITH NONUNION, SUBSEQUENT ENCOUNTER: Primary | ICD-10-CM

## 2025-07-29 PROCEDURE — 97140 MANUAL THERAPY 1/> REGIONS: CPT

## 2025-07-29 PROCEDURE — 97110 THERAPEUTIC EXERCISES: CPT

## 2025-07-31 ENCOUNTER — OFFICE VISIT (OUTPATIENT)
Dept: OCCUPATIONAL THERAPY | Facility: CLINIC | Age: 65
End: 2025-07-31
Payer: COMMERCIAL

## 2025-07-31 DIAGNOSIS — S52.121K CLOSED DISPLACED FRACTURE OF HEAD OF RIGHT RADIUS WITH NONUNION, SUBSEQUENT ENCOUNTER: Primary | ICD-10-CM

## 2025-07-31 PROCEDURE — 97110 THERAPEUTIC EXERCISES: CPT

## (undated) DEVICE — Device

## (undated) DEVICE — PACK PBDS PLASTIC HAND RF

## (undated) DEVICE — CUFF TOURNIQUET 18 X 5.5 IN QUICK CONNECT 2BLA

## (undated) DEVICE — SUT STRATAFIX SPIRAL MONOCRYL PLUS 3-0 PS-2 45CM SXMP1B107